# Patient Record
Sex: MALE | Race: WHITE | NOT HISPANIC OR LATINO | Employment: OTHER | URBAN - METROPOLITAN AREA
[De-identification: names, ages, dates, MRNs, and addresses within clinical notes are randomized per-mention and may not be internally consistent; named-entity substitution may affect disease eponyms.]

---

## 2024-07-08 ENCOUNTER — HOSPITAL ENCOUNTER (OUTPATIENT)
Facility: HOSPITAL | Age: 84
Discharge: HOME | End: 2024-07-09
Attending: EMERGENCY MEDICINE | Admitting: EMERGENCY MEDICINE
Payer: MEDICARE

## 2024-07-08 ENCOUNTER — APPOINTMENT (OUTPATIENT)
Facility: HOSPITAL | Age: 84
End: 2024-07-08
Payer: MEDICARE

## 2024-07-08 DIAGNOSIS — R55 SYNCOPE AND COLLAPSE: Primary | ICD-10-CM

## 2024-07-08 DIAGNOSIS — N17.9 ACUTE KIDNEY INJURY: ICD-10-CM

## 2024-07-08 DIAGNOSIS — D72.829 LEUKOCYTOSIS, UNSPECIFIED TYPE: ICD-10-CM

## 2024-07-08 DIAGNOSIS — E86.0 DEHYDRATION: ICD-10-CM

## 2024-07-08 LAB
ALBUMIN SERPL-MCNC: 4.2 G/DL (ref 3.5–5.2)
ALBUMIN/GLOB SERPL: 1.3 G/DL
ALP SERPL-CCNC: 94 U/L (ref 39–117)
ALT SERPL W P-5'-P-CCNC: 6 U/L (ref 1–41)
ANION GAP SERPL CALCULATED.3IONS-SCNC: 14.8 MMOL/L (ref 5–15)
AST SERPL-CCNC: 22 U/L (ref 1–40)
BACTERIA UR QL AUTO: NORMAL /HPF
BASOPHILS # BLD AUTO: 0.05 10*3/MM3 (ref 0–0.2)
BASOPHILS NFR BLD AUTO: 0.3 % (ref 0–1.5)
BILIRUB SERPL-MCNC: 0.7 MG/DL (ref 0–1.2)
BILIRUB UR QL STRIP: NEGATIVE
BUN SERPL-MCNC: 33 MG/DL (ref 8–23)
BUN/CREAT SERPL: 17.1 (ref 7–25)
CALCIUM SPEC-SCNC: 9.6 MG/DL (ref 8.6–10.5)
CHLORIDE SERPL-SCNC: 102 MMOL/L (ref 98–107)
CLARITY UR: CLEAR
CO2 SERPL-SCNC: 24.2 MMOL/L (ref 22–29)
COLOR UR: YELLOW
CREAT SERPL-MCNC: 1.93 MG/DL (ref 0.76–1.27)
DEPRECATED RDW RBC AUTO: 48.6 FL (ref 37–54)
EGFRCR SERPLBLD CKD-EPI 2021: 33.7 ML/MIN/1.73
EOSINOPHIL # BLD AUTO: 0.12 10*3/MM3 (ref 0–0.4)
EOSINOPHIL NFR BLD AUTO: 0.6 % (ref 0.3–6.2)
ERYTHROCYTE [DISTWIDTH] IN BLOOD BY AUTOMATED COUNT: 14.8 % (ref 12.3–15.4)
GLOBULIN UR ELPH-MCNC: 3.2 GM/DL
GLUCOSE BLDC GLUCOMTR-MCNC: 201 MG/DL (ref 70–130)
GLUCOSE SERPL-MCNC: 208 MG/DL (ref 65–99)
GLUCOSE UR STRIP-MCNC: ABNORMAL MG/DL
HCT VFR BLD AUTO: 41.4 % (ref 37.5–51)
HGB BLD-MCNC: 14.1 G/DL (ref 13–17.7)
HGB UR QL STRIP.AUTO: NEGATIVE
HOLD SPECIMEN: NORMAL
HYALINE CASTS UR QL AUTO: NORMAL /LPF
IMM GRANULOCYTES # BLD AUTO: 0.14 10*3/MM3 (ref 0–0.05)
IMM GRANULOCYTES NFR BLD AUTO: 0.7 % (ref 0–0.5)
KETONES UR QL STRIP: NEGATIVE
LEUKOCYTE ESTERASE UR QL STRIP.AUTO: NEGATIVE
LYMPHOCYTES # BLD AUTO: 1.88 10*3/MM3 (ref 0.7–3.1)
LYMPHOCYTES NFR BLD AUTO: 9.8 % (ref 19.6–45.3)
MAGNESIUM SERPL-MCNC: 2.5 MG/DL (ref 1.6–2.4)
MCH RBC QN AUTO: 30.3 PG (ref 26.6–33)
MCHC RBC AUTO-ENTMCNC: 34.1 G/DL (ref 31.5–35.7)
MCV RBC AUTO: 89 FL (ref 79–97)
MONOCYTES # BLD AUTO: 1.05 10*3/MM3 (ref 0.1–0.9)
MONOCYTES NFR BLD AUTO: 5.5 % (ref 5–12)
MUCOUS THREADS URNS QL MICRO: NORMAL /HPF
NEUTROPHILS NFR BLD AUTO: 15.98 10*3/MM3 (ref 1.7–7)
NEUTROPHILS NFR BLD AUTO: 83.1 % (ref 42.7–76)
NITRITE UR QL STRIP: NEGATIVE
NT-PROBNP SERPL-MCNC: 195 PG/ML (ref 0–1800)
PH UR STRIP.AUTO: 6 [PH] (ref 5–8)
PLATELET # BLD AUTO: 191 10*3/MM3 (ref 140–450)
PMV BLD AUTO: 10.2 FL (ref 6–12)
POTASSIUM SERPL-SCNC: 3.9 MMOL/L (ref 3.5–5.2)
PROT SERPL-MCNC: 7.4 G/DL (ref 6–8.5)
PROT UR QL STRIP: ABNORMAL
RBC # BLD AUTO: 4.65 10*6/MM3 (ref 4.14–5.8)
RBC # UR STRIP: NORMAL /HPF
REF LAB TEST METHOD: NORMAL
SODIUM SERPL-SCNC: 141 MMOL/L (ref 136–145)
SP GR UR STRIP: 1.01 (ref 1–1.03)
SQUAMOUS #/AREA URNS HPF: NORMAL /HPF
TROPONIN T SERPL HS-MCNC: 20 NG/L
TSH SERPL DL<=0.05 MIU/L-ACNC: 3.37 UIU/ML (ref 0.27–4.2)
UROBILINOGEN UR QL STRIP: ABNORMAL
WBC # UR STRIP: NORMAL /HPF
WBC NRBC COR # BLD AUTO: 19.22 10*3/MM3 (ref 3.4–10.8)
WHOLE BLOOD HOLD COAG: NORMAL
WHOLE BLOOD HOLD SPECIMEN: NORMAL

## 2024-07-08 PROCEDURE — 81001 URINALYSIS AUTO W/SCOPE: CPT | Performed by: EMERGENCY MEDICINE

## 2024-07-08 PROCEDURE — 83735 ASSAY OF MAGNESIUM: CPT | Performed by: EMERGENCY MEDICINE

## 2024-07-08 PROCEDURE — 84443 ASSAY THYROID STIM HORMONE: CPT | Performed by: EMERGENCY MEDICINE

## 2024-07-08 PROCEDURE — 93005 ELECTROCARDIOGRAM TRACING: CPT

## 2024-07-08 PROCEDURE — 25810000003 LACTATED RINGERS PER 1000 ML: Performed by: PHYSICIAN ASSISTANT

## 2024-07-08 PROCEDURE — 83880 ASSAY OF NATRIURETIC PEPTIDE: CPT | Performed by: EMERGENCY MEDICINE

## 2024-07-08 PROCEDURE — 80053 COMPREHEN METABOLIC PANEL: CPT | Performed by: EMERGENCY MEDICINE

## 2024-07-08 PROCEDURE — 84484 ASSAY OF TROPONIN QUANT: CPT | Performed by: EMERGENCY MEDICINE

## 2024-07-08 PROCEDURE — 36415 COLL VENOUS BLD VENIPUNCTURE: CPT

## 2024-07-08 PROCEDURE — 93005 ELECTROCARDIOGRAM TRACING: CPT | Performed by: EMERGENCY MEDICINE

## 2024-07-08 PROCEDURE — 99285 EMERGENCY DEPT VISIT HI MDM: CPT

## 2024-07-08 PROCEDURE — 85025 COMPLETE CBC W/AUTO DIFF WBC: CPT | Performed by: EMERGENCY MEDICINE

## 2024-07-08 PROCEDURE — 25810000003 LACTATED RINGERS SOLUTION: Performed by: EMERGENCY MEDICINE

## 2024-07-08 PROCEDURE — 71045 X-RAY EXAM CHEST 1 VIEW: CPT

## 2024-07-08 PROCEDURE — 82948 REAGENT STRIP/BLOOD GLUCOSE: CPT

## 2024-07-08 RX ORDER — HYDRALAZINE HYDROCHLORIDE 25 MG/1
25 TABLET, FILM COATED ORAL ONCE
Status: DISCONTINUED | OUTPATIENT
Start: 2024-07-08 | End: 2024-07-08

## 2024-07-08 RX ORDER — ATORVASTATIN CALCIUM 40 MG/1
40 TABLET, FILM COATED ORAL DAILY
COMMUNITY

## 2024-07-08 RX ORDER — ACETAMINOPHEN 325 MG/1
650 TABLET ORAL EVERY 6 HOURS PRN
Status: DISCONTINUED | OUTPATIENT
Start: 2024-07-08 | End: 2024-07-09 | Stop reason: HOSPADM

## 2024-07-08 RX ORDER — AMLODIPINE BESYLATE 5 MG/1
5 TABLET ORAL DAILY
COMMUNITY

## 2024-07-08 RX ORDER — ASPIRIN 81 MG/1
81 TABLET ORAL DAILY
COMMUNITY

## 2024-07-08 RX ORDER — CARVEDILOL 25 MG/1
25 TABLET ORAL 2 TIMES DAILY WITH MEALS
COMMUNITY

## 2024-07-08 RX ORDER — POTASSIUM CHLORIDE 750 MG/1
10 CAPSULE, EXTENDED RELEASE ORAL 2 TIMES DAILY
COMMUNITY

## 2024-07-08 RX ORDER — HYDRALAZINE HYDROCHLORIDE 25 MG/1
25 TABLET, FILM COATED ORAL ONCE
Status: COMPLETED | OUTPATIENT
Start: 2024-07-08 | End: 2024-07-08

## 2024-07-08 RX ORDER — FUROSEMIDE 20 MG/1
20 TABLET ORAL 2 TIMES DAILY
COMMUNITY

## 2024-07-08 RX ORDER — SODIUM CHLORIDE, SODIUM LACTATE, POTASSIUM CHLORIDE, CALCIUM CHLORIDE 600; 310; 30; 20 MG/100ML; MG/100ML; MG/100ML; MG/100ML
75 INJECTION, SOLUTION INTRAVENOUS CONTINUOUS
Status: DISCONTINUED | OUTPATIENT
Start: 2024-07-08 | End: 2024-07-09 | Stop reason: HOSPADM

## 2024-07-08 RX ORDER — HYDRALAZINE HYDROCHLORIDE 25 MG/1
25 TABLET, FILM COATED ORAL 3 TIMES DAILY
COMMUNITY

## 2024-07-08 RX ORDER — ATORVASTATIN CALCIUM 40 MG/1
40 TABLET, FILM COATED ORAL NIGHTLY
Status: DISCONTINUED | OUTPATIENT
Start: 2024-07-08 | End: 2024-07-09 | Stop reason: HOSPADM

## 2024-07-08 RX ORDER — SODIUM CHLORIDE 0.9 % (FLUSH) 0.9 %
10 SYRINGE (ML) INJECTION AS NEEDED
Status: DISCONTINUED | OUTPATIENT
Start: 2024-07-08 | End: 2024-07-09 | Stop reason: HOSPADM

## 2024-07-08 RX ORDER — DAPAGLIFLOZIN 5 MG/1
5 TABLET, FILM COATED ORAL DAILY
COMMUNITY

## 2024-07-08 RX ORDER — ONDANSETRON 2 MG/ML
4 INJECTION INTRAMUSCULAR; INTRAVENOUS EVERY 6 HOURS PRN
Status: DISCONTINUED | OUTPATIENT
Start: 2024-07-08 | End: 2024-07-09 | Stop reason: HOSPADM

## 2024-07-08 RX ADMIN — ATORVASTATIN CALCIUM 40 MG: 40 TABLET, FILM COATED ORAL at 20:08

## 2024-07-08 RX ADMIN — HYDRALAZINE HYDROCHLORIDE 25 MG: 25 TABLET ORAL at 20:08

## 2024-07-08 RX ADMIN — SODIUM CHLORIDE, POTASSIUM CHLORIDE, SODIUM LACTATE AND CALCIUM CHLORIDE 1000 ML: 600; 310; 30; 20 INJECTION, SOLUTION INTRAVENOUS at 16:03

## 2024-07-08 RX ADMIN — SODIUM CHLORIDE, POTASSIUM CHLORIDE, SODIUM LACTATE AND CALCIUM CHLORIDE 125 ML/HR: 600; 310; 30; 20 INJECTION, SOLUTION INTRAVENOUS at 19:34

## 2024-07-08 NOTE — ED NOTES
" Tian Forde    Nursing Report ED to Floor:  Mental status: a & o x 4  Ambulatory status: ambulatory x 1 assist  Oxygen Therapy:  n/a  Cardiac Rhythm: sinus aleksander with rbbb  Admitted from: ed  Safety Concerns:  n/a  Social Issues: n/a- wife at bs   ED Room #:  17    ED Nurse Phone Extension - [phone] or may call [phone].      HPI:   Chief Complaint   Patient presents with    Syncope       Past Medical History:  Past Medical History:   Diagnosis Date    Diabetes mellitus     Diverticulitis     Hyperlipidemia     Hypertension         Past Surgical History:  Past Surgical History:   Procedure Laterality Date    CAROTID ENDARTERECTOMY      COLON RESECTION      TOTAL HIP ARTHROPLASTY Left         Admitting Doctor:   No admitting provider for patient encounter.    Consulting Provider(s):  Consults       No orders found from 6/9/2024 to 7/9/2024.             Admitting Diagnosis:   The encounter diagnosis was Syncope and collapse.    Most Recent Vitals:   Vitals:    07/08/24 1445 07/08/24 1603   BP: (!) 186/89 158/73   BP Location: Right arm Right arm   Patient Position: Lying Lying   Pulse: 62    Resp: 18    Temp: 97.9 °F (36.6 °C)    TempSrc: Oral    SpO2: 95%    Weight: 87.1 kg (192 lb)    Height: 177.8 cm (70\")        Active LDAs/IV Access:   Lines, Drains & Airways       Active LDAs       Name Placement date Placement time Site Days    Peripheral IV 07/08/24 Left;Posterior Wrist 07/08/24  --  Wrist  less than 1                    Labs (abnormal labs have a star):   Labs Reviewed   COMPREHENSIVE METABOLIC PANEL - Abnormal; Notable for the following components:       Result Value    Glucose 208 (*)     BUN 33 (*)     Creatinine 1.93 (*)     eGFR 33.7 (*)     All other components within normal limits    Narrative:     GFR Normal >60  Chronic Kidney Disease <60  Kidney Failure <15    The GFR formula is only valid for adults with stable renal function between ages 18 and 70.   MAGNESIUM - Abnormal; Notable for the " following components:    Magnesium 2.5 (*)     All other components within normal limits   CBC WITH AUTO DIFFERENTIAL - Abnormal; Notable for the following components:    WBC 19.22 (*)     Neutrophil % 83.1 (*)     Lymphocyte % 9.8 (*)     Immature Grans % 0.7 (*)     Neutrophils, Absolute 15.98 (*)     Monocytes, Absolute 1.05 (*)     Immature Grans, Absolute 0.14 (*)     All other components within normal limits   URINALYSIS W/ MICROSCOPIC IF INDICATED (NO CULTURE) - Abnormal; Notable for the following components:    Glucose, UA >=1000 mg/dL (3+) (*)     Protein, UA 30 mg/dL (1+) (*)     All other components within normal limits   SINGLE HS TROPONIN T - Normal   TSH - Normal   BNP (IN-HOUSE) - Normal    Narrative:     This assay is used as an aid in the diagnosis of individuals suspected of having heart failure. It can be used as an aid in the diagnosis of acute decompensated heart failure (ADHF) in patients presenting with signs and symptoms of ADHF to the emergency department (ED). In addition, NT-proBNP of <300 pg/mL indicates ADHF is not likely.    Age Range Result Interpretation  NT-proBNP Concentration (pg/mL:      <50             Positive            >450                   Gray                 300-450                    Negative             <300    50-75           Positive            >900                  Gray                300-900                  Negative            <300      >75             Positive            >1800                  Gray                300-1800                  Negative            <300   RAINBOW DRAW    Narrative:     The following orders were created for panel order [place] Draw.  Procedure                               Abnormality         Status                     ---------                               -----------         ------                     Green Top (Gel)[[id number]]                                  Final result               Lavender Top[[id number]]                                      Final result               Gold Top - SST[[id number]]                                   Final result               Gray Top[[id number]]                                         Final result               Light Blue Top[[id number]]                                   Final result                 Please view results for these tests on the individual orders.   URINALYSIS, MICROSCOPIC ONLY   CBC AND DIFFERENTIAL    Narrative:     The following orders were created for panel order CBC & Differential.  Procedure                               Abnormality         Status                     ---------                               -----------         ------                     CBC Auto Differential[[id number]]        Abnormal            Final result                 Please view results for these tests on the individual orders.   GREEN TOP   LAVENDER TOP   GOLD TOP - SST   GRAY TOP   LIGHT BLUE TOP       Meds Given in ED:   Medications   sodium chloride 0.9 % flush 10 mL (has no administration in time range)   lactated ringers bolus 1,000 mL (1,000 mL Intravenous New Bag 7/8/24 1603)           Last NIH score:                                                          Dysphagia screening results:        [place] Coma Scale:  No data recorded     CIWA:        Restraint Type:            Isolation Status:  No active isolations

## 2024-07-08 NOTE — FSED PROVIDER NOTE
"Subjective  History of Present Illness:    Patient presents with episode of syncope.  Patient states he was out in the heat for several hours today and working on significant vertigo lightheadedness/wife states he was pale and sweaty after this she called EMS to come get the patient.  Patient completely passed out.  States now he feels much better denies any chest pain or shortness of breath at the time no nausea vomiting no numbness tingling weakness.  Patient does have a history of CHF      Nurses Notes reviewed and agree, including vitals, allergies, social history and prior medical history.     REVIEW OF SYSTEMS: All systems reviewed and not pertinent unless noted.  Review of Systems    Past Medical History:   Diagnosis Date    Diabetes mellitus     Diverticulitis     Hyperlipidemia     Hypertension        Allergies:    Patient has no known allergies.      Past Surgical History:   Procedure Laterality Date    CAROTID ENDARTERECTOMY      COLON RESECTION      TOTAL HIP ARTHROPLASTY Left          Social History     Socioeconomic History    Marital status:    Tobacco Use    Smoking status: Never    Smokeless tobacco: Never   Substance and Sexual Activity    Alcohol use: Not Currently         History reviewed. No pertinent family history.    Objective  Physical Exam:  /73 (BP Location: Right arm, Patient Position: Lying)   Pulse 62   Temp 97.9 °F (36.6 °C) (Oral)   Resp 18   Ht 177.8 cm (70\")   Wt 87.1 kg (192 lb)   SpO2 95%   BMI 27.55 kg/m²      Physical Exam  Vitals and nursing note reviewed.   Constitutional:       General: He is not in acute distress.     Appearance: He is well-developed. He is not ill-appearing, toxic-appearing or diaphoretic.   HENT:      Head: Normocephalic and atraumatic.      Mouth/Throat:      Mouth: Mucous membranes are moist.   Eyes:      Pupils: Pupils are equal, round, and reactive to light.   Cardiovascular:      Rate and Rhythm: Normal rate and regular rhythm.     "  Pulses: Normal pulses.      Heart sounds: Normal heart sounds.   Pulmonary:      Effort: Pulmonary effort is normal. No tachypnea, accessory muscle usage or respiratory distress.      Breath sounds: Normal breath sounds. No decreased breath sounds, wheezing, rhonchi or rales.   Abdominal:      Palpations: Abdomen is soft.      Tenderness: There is no abdominal tenderness. There is no guarding or rebound.   Musculoskeletal:         General: Normal range of motion.      Right lower leg: No edema.   Skin:     General: Skin is warm and dry.      Capillary Refill: Capillary refill takes less than 2 seconds.   Neurological:      General: No focal deficit present.      Mental Status: He is alert and oriented to person, place, and time.   Psychiatric:         Mood and Affect: Mood normal.         Behavior: Behavior normal.         ECG 12 Lead      Date/Time: 7/8/2024 4:15 PM    Performed by: Asher Koch DO  Authorized by: Asher Koch DO  Interpreted by ED physician  Rhythm: sinus rhythm  Rate: normal  BPM: 59  QRS axis: normal  Conduction: complete RBBB  ST Segments: ST segments normal  T Waves: T waves normal  Other: no other findings  Clinical impression: normal ECG        ED Course:         Lab Results (last 24 hours)       Procedure Component Value Units Date/Time    Comprehensive Metabolic Panel [[id number]]  (Abnormal) Collected: 07/08/24 1449    Specimen: Blood Updated: 07/08/24 1521     Glucose 208 mg/dL      BUN 33 mg/dL      Creatinine 1.93 mg/dL      Sodium 141 mmol/L      Potassium 3.9 mmol/L      Chloride 102 mmol/L      CO2 24.2 mmol/L      Calcium 9.6 mg/dL      Total Protein 7.4 g/dL      Albumin 4.2 g/dL      ALT (SGPT) 6 U/L      AST (SGOT) 22 U/L      Alkaline Phosphatase 94 U/L      Total Bilirubin 0.7 mg/dL      Globulin 3.2 gm/dL      A/G Ratio 1.3 g/dL      BUN/Creatinine Ratio 17.1     Anion Gap 14.8 mmol/L      eGFR 33.7 mL/min/1.73     Narrative:      GFR Normal >60  Chronic Kidney Disease  <60  Kidney Failure <15    The GFR formula is only valid for adults with stable renal function between ages 18 and 70.    Magnesium [[id number]]  (Abnormal) Collected: 07/08/24 1449    Specimen: Blood Updated: 07/08/24 1521     Magnesium 2.5 mg/dL     Single High Sensitivity Troponin T [[id number]]  (Normal) Collected: 07/08/24 1449    Specimen: Blood Updated: 07/08/24 1518     HS Troponin T 20 ng/L     TSH [[id number]]  (Normal) Collected: 07/08/24 1449    Specimen: Blood Updated: 07/08/24 1526     TSH 3.370 uIU/mL     BNP [[id number]]  (Normal) Collected: 07/08/24 1449    Specimen: Blood Updated: 07/08/24 1519     proBNP 195.0 pg/mL     Narrative:      This assay is used as an aid in the diagnosis of individuals suspected of having heart failure. It can be used as an aid in the diagnosis of acute decompensated heart failure (ADHF) in patients presenting with signs and symptoms of ADHF to the emergency department (ED). In addition, NT-proBNP of <300 pg/mL indicates ADHF is not likely.    Age Range Result Interpretation  NT-proBNP Concentration (pg/mL:      <50             Positive            >450                   Gray                 300-450                    Negative             <300    50-75           Positive            >900                  Gray                300-900                  Negative            <300      >75             Positive            >1800                  Gray                300-1800                  Negative            <300    CBC & Differential [[id number]]  (Abnormal) Collected: 07/08/24 1450    Specimen: Blood Updated: 07/08/24 1502    Narrative:      The following orders were created for panel order CBC & Differential.  Procedure                               Abnormality         Status                     ---------                               -----------         ------                     CBC Auto Differential[[id number]]        Abnormal            Final result                 Please view  results for these tests on the individual orders.    CBC Auto Differential [[id number]]  (Abnormal) Collected: 07/08/24 1450    Specimen: Blood Updated: 07/08/24 1502     WBC 19.22 10*3/mm3      RBC 4.65 10*6/mm3      Hemoglobin 14.1 g/dL      Hematocrit 41.4 %      MCV 89.0 fL      MCH 30.3 pg      MCHC 34.1 g/dL      RDW 14.8 %      RDW-SD 48.6 fl      MPV 10.2 fL      Platelets 191 10*3/mm3      Neutrophil % 83.1 %      Lymphocyte % 9.8 %      Monocyte % 5.5 %      Eosinophil % 0.6 %      Basophil % 0.3 %      Immature Grans % 0.7 %      Neutrophils, Absolute 15.98 10*3/mm3      Lymphocytes, Absolute 1.88 10*3/mm3      Monocytes, Absolute 1.05 10*3/mm3      Eosinophils, Absolute 0.12 10*3/mm3      Basophils, Absolute 0.05 10*3/mm3      Immature Grans, Absolute 0.14 10*3/mm3     Urinalysis With Microscopic If Indicated (No Culture) - Urine, Clean Catch [[id number]]  (Abnormal) Collected: 07/08/24 1537    Specimen: Urine, Clean Catch Updated: 07/08/24 1544     Color, UA Yellow     Appearance, UA Clear     pH, UA 6.0     Specific Gravity, UA 1.015     Glucose, UA >=1000 mg/dL (3+)     Ketones, UA Negative     Bilirubin, UA Negative     Blood, UA Negative     Protein, UA 30 mg/dL (1+)     Leuk Esterase, UA Negative     Nitrite, UA Negative     Urobilinogen, UA 0.2 E.U./dL    Urinalysis, Microscopic Only - Urine, Clean Catch [[id number]] Collected: 07/08/24 1537    Specimen: Urine, Clean Catch Updated: 07/08/24 1551     RBC, UA None Seen /HPF      WBC, UA None Seen /HPF      Bacteria, UA None Seen /HPF      Squamous Epithelial Cells, UA 0-2 /HPF      Hyaline Casts, UA 0-2 /LPF      Mucus, UA Trace /HPF      Methodology Manual Light Microscopy             XR Chest 1 View    Result Date: 7/8/2024  XR CHEST 1 VW Date of Exam: 7/8/2024 2:54 PM EDT Indication: Dysrhythmia triage protocol fainting spell Comparison: None available. Findings: Heart and pulmonary vessels appear within normal limits. Lung fields are well inflated  and clear of acute infiltrates or effusions. There is no pneumothorax. There is a fracture of the right eighth rib posteriorly although appears most likely old.    Impression: Impression: No acute process in the lung fields. Probable old right eighth rib fracture. Correlation to any site of acute pain recommended. Electronically Signed: Dang Jackson MD  7/8/2024 2:58 PM EDT  Workstation ID: [id number]        [facility]  Number of Diagnoses or Management Options  Syncope and collapse  Diagnosis management comments: Patient presenting with syncope.  Patient is otherwise well-appearing upon my assessment hypertensive EKG shows no acute ischemia or arrhythmia.  CBC has a leukocytosis of 19,000 CMP creatinine 1.9 urinalysis negative for infection chest x-ray is clear patient has no history of elevated creatinine or CKD we will go ahead and give 1 L of fluid.  Patient's leukocytosis I feel like it is most likely from his event today as the patient exhibits no signs of sepsis.  Most likely demargination of white blood cells.  Given his well appearance and improvement and no obvious cause other than he or vasovagal syncope we will go ahead and admit to the CDU for rehydration and repeat creatinine in the morning.  If no improvement or worsening patient will need to be admitted to the hospital.              Data interpreted: Nursing notes reviewed, vital signs reviewed.  Labs independently interpreted by me (CBC, CMP, lipase, UA, troponin, ABG, lactic acid, procalcitonin).  Imaging independently interpreted by me (x-ray, CT scan).  EKG independently interpreted by me.  O2 saturation:    Counseling: Discussed the results above with the patient regarding need for admission or discharge.  Patient understands and agrees plan of care.      -----  ED Disposition       ED Disposition   Send to Specialty Department    Condition   --    Comment   Level of Care: Telemetry [5]   Diagnosis: Syncope and collapse [780.2.ICD-9-CM]   Patient  Class: Outpatient [102]               Final diagnoses:   Syncope and collapse     Your Follow-Up Providers    Follow-up information has not been specified.       Contact information for after-discharge care    Follow-up information has not been specified.          Your medication list        ASK your doctor about these medications        Instructions Last Dose Given Next Dose Due   amLODIPine 5 MG tablet  Commonly known as: NORVASC      Take 1 tablet by mouth Daily.       aspirin 81 MG EC tablet      Take 1 tablet by mouth Daily.       atorvastatin 40 MG tablet  Commonly known as: LIPITOR      Take 1 tablet by mouth Daily.       carvedilol 25 MG tablet  Commonly known as: COREG      Take 1 tablet by mouth 2 (Two) Times a Day With Meals.       Farxiga 5 MG tablet tablet  Generic drug: dapagliflozin      Take 1 tablet by mouth Daily.       furosemide 20 MG tablet  Commonly known as: LASIX      Take 1 tablet by mouth 2 (Two) Times a Day.       hydrALAZINE 25 MG tablet  Commonly known as: APRESOLINE      Take 1 tablet by mouth 3 (Three) Times a Day.       potassium chloride 10 MEQ CR capsule  Commonly known as: MICRO-K      Take 1 capsule by mouth 2 (Two) Times a Day.

## 2024-07-08 NOTE — PROGRESS NOTES
[facility]     Progress Note    Patient Name: Tian Forde  : 1940  MRN: [id number]  Primary Care Physician:  Isaac, No Known  Date of admission: 2024    Subjective   Subjective     Chief Complaint: Presyncope, DAISY, leukocytosis    History of Present Illness  Patient Reports patient is an 84-year-old male with medical history of hypertension, hyperlipidemia, diabetes, CHF presents to the emergency department for evaluation of previous syncope that occurred today around noon.  Patient did 3 hours of yard work in the heat when he had an episode of feeling lightheaded and appeared pale prompting his wife to call EMS to have him brought to the emergency department.  Patient denies loss of consciousness, fall to the ground.  Patient denies dyspnea, chest pain, fever or recent illness.  Patient states symptoms lasted approximately 5 to 10 minutes before resolving.  Patient has been asymptomatic since with no symptoms on arrival to the CDU.  Patient was given 1 L of fluids in the emergency department.  Patient was found to have DAISY with a creatinine of 1.9 without previous values to compare to.  Patient denies known history of CKD.  Patient is also noticed to have white count of 19 without symptoms of infection and without known source.  Patient has negative chest x-ray and urinalysis.  At this time leukocytosis is likely reactive due to patient's recent episode.  Patient's initial troponin is unremarkable.    Review of Systems   Constitutional:  Negative for chills and fever.   HENT:  Negative for ear pain, sore throat and trouble swallowing.    Eyes:  Negative for visual disturbance.   Respiratory:  Negative for cough and shortness of breath.    Cardiovascular:  Negative for chest pain, palpitations and leg swelling.   Gastrointestinal:  Negative for abdominal distention, abdominal pain, nausea and vomiting.   Endocrine: Negative for cold intolerance and heat intolerance.   Genitourinary:   Negative for dysuria, flank pain, frequency and urgency.   Musculoskeletal:  Negative for back pain, gait problem and neck pain.   Skin:  Negative for rash.   Allergic/Immunologic: Negative for immunocompromised state.   Neurological:  Positive for light-headedness. Negative for dizziness, syncope and numbness.   Hematological:  Does not bruise/bleed easily.   Psychiatric/Behavioral:  Negative for hallucinations. The patient is not nervous/anxious.    All other systems reviewed and are negative.      Objective   Objective     Vitals:   Temp:  [97.9 °F (36.6 °C)] 97.9 °F (36.6 °C)  Heart Rate:  [57-62] 57  Resp:  [18] 18  BP: (158-186)/(73-89) 181/81    Physical Exam  Vitals and nursing note reviewed.   Constitutional:       General: He is not in acute distress.     Appearance: He is not toxic-appearing.   HENT:      Head: Normocephalic and atraumatic.      Nose: Nose normal.      Mouth/Throat:      Mouth: Mucous membranes are moist.   Eyes:      Extraocular Movements: Extraocular movements intact.      Conjunctiva/sclera: Conjunctivae normal.      Pupils: Pupils are equal, round, and reactive to light.   Cardiovascular:      Rate and Rhythm: Normal rate and regular rhythm.      Pulses: Normal pulses.      Heart sounds: Normal heart sounds. No murmur heard.  Pulmonary:      Effort: Pulmonary effort is normal.      Breath sounds: Normal breath sounds. No stridor. No wheezing or rhonchi.   Abdominal:      General: Bowel sounds are normal. There is no distension.      Palpations: Abdomen is soft.      Tenderness: There is no abdominal tenderness. There is no guarding or rebound.   Musculoskeletal:         General: No deformity. Normal range of motion.      Cervical back: Normal range of motion and neck supple.      Right lower leg: No edema.      Left lower leg: No edema.   Skin:     General: Skin is warm and dry.      Capillary Refill: Capillary refill takes less than 2 seconds.      Findings: No erythema or rash.    Neurological:      General: No focal deficit present.      Mental Status: He is alert and oriented to person, place, and time.      Cranial Nerves: No cranial nerve deficit.      Sensory: No sensory deficit.      Coordination: Coordination normal.      Gait: Gait normal.   Psychiatric:         Mood and Affect: Mood normal.         Behavior: Behavior normal.          Result Review    Result Review:  I have personally reviewed the results from the time of this admission to 7/8/2024 17:46 EDT and agree with these findings:  [x]  Laboratory list / accordion  []  Microbiology  [x]  Radiology  [x]  EKG/Telemetry   []  Cardiology/Vascular   []  Pathology  []  Old records  []  Other:  Most notable findings include: WBC 19, troponin 0 hours 20.  Creatinine 1.9      Assessment & Plan   Assessment / Plan     Brief Patient Summary:  Tian Forde is a 84 y.o. male who will be observed in the CDU for further evaluation of likely DAISY, leukocytosis, presyncopal event.  Patient reports a 5 to 10-minute episode of feeling lightheaded and appearing pale without loss of consciousness or fall to the ground.  Patient denies chest pain, dyspnea, recent fever or illness.  Patient is asymptomatic on arrival to CDU.    Active Hospital Problems:  There are no active hospital problems to display for this patient.    Plan: Patient received 1 L of fluids in the ED.  Patient plan includes serial troponins, fluid hydration with maintenance fluids, reevaluation of CBC, creatinine level, reevaluation.  Patient denies symptoms on arrival to the CDU and symptoms are likely heat related.  Patient denies chest pain or dyspnea with concern for cardiac etiology low at this time. In the setting of CHF history there will be consideration for possible echo in the morning if the patient once again developed symptoms.    Pre-Syncope:  - Serial Troponins. Troponin 0 hour 20.  - Orthostatic vital signs unremarkable  - Maintenance  mL/hr  -  Cardiac Monitoring  - Q 4 hour vital signs    DAISY:  - Creatinine 1.9 without baseline to compare to. Repeat AM BMP.  - UA shows mild proteinuria without hematuria or evidence of UTI  - Maintenance  mL/hr  - Avoid nephrotoxic medications    Leukocytosis:  - Likely reactive, patient without symptoms of infection. Does not meet sepsis criteria.  - Negative CXR  - Negative UA  - Repeat AM CBC    HTN:  - Hydralazine 25 mg BID  - Cardiac Monitor    DMII  -   - LR 1 liter in the ED. Maintenance fluids  - Farxiga 5 mg QD  - Fingerstick AC, Qhs    VTE Prophylaxis:  Mechanical VTE prophylaxis orders are present.        CODE STATUS:       Disposition:  I expect patient to be discharged 7/9/2024.    ANITRA Morris

## 2024-07-09 ENCOUNTER — APPOINTMENT (OUTPATIENT)
Facility: HOSPITAL | Age: 84
End: 2024-07-09
Payer: MEDICARE

## 2024-07-09 VITALS
TEMPERATURE: 97.5 F | WEIGHT: 192 LBS | RESPIRATION RATE: 19 BRPM | HEIGHT: 70 IN | HEART RATE: 60 BPM | DIASTOLIC BLOOD PRESSURE: 71 MMHG | OXYGEN SATURATION: 91 % | SYSTOLIC BLOOD PRESSURE: 160 MMHG | BODY MASS INDEX: 27.49 KG/M2

## 2024-07-09 LAB
ANION GAP SERPL CALCULATED.3IONS-SCNC: 12.8 MMOL/L (ref 5–15)
BASOPHILS # BLD AUTO: 0.06 10*3/MM3 (ref 0–0.2)
BASOPHILS NFR BLD AUTO: 0.5 % (ref 0–1.5)
BH CV ECHO MEAS - AI P1/2T: 777.7 MSEC
BH CV ECHO MEAS - AO MAX PG: 14.4 MMHG
BH CV ECHO MEAS - AO MEAN PG: 8 MMHG
BH CV ECHO MEAS - AO ROOT DIAM: 3.2 CM
BH CV ECHO MEAS - AO V2 MAX: 189.8 CM/SEC
BH CV ECHO MEAS - AO V2 VTI: 48.1 CM
BH CV ECHO MEAS - AVA(I,D): 1.49 CM2
BH CV ECHO MEAS - EDV(CUBED): 110.6 ML
BH CV ECHO MEAS - EDV(MOD-SP2): 185 ML
BH CV ECHO MEAS - EDV(MOD-SP4): 190 ML
BH CV ECHO MEAS - EF(MOD-BP): 62.4 %
BH CV ECHO MEAS - EF(MOD-SP2): 64.6 %
BH CV ECHO MEAS - EF(MOD-SP4): 61.4 %
BH CV ECHO MEAS - ESV(CUBED): 29.8 ML
BH CV ECHO MEAS - ESV(MOD-SP2): 65.4 ML
BH CV ECHO MEAS - ESV(MOD-SP4): 73.4 ML
BH CV ECHO MEAS - FS: 35.4 %
BH CV ECHO MEAS - IVS/LVPW: 1 CM
BH CV ECHO MEAS - IVSD: 1 CM
BH CV ECHO MEAS - LA DIMENSION: 3.9 CM
BH CV ECHO MEAS - LAT PEAK E' VEL: 7.8 CM/SEC
BH CV ECHO MEAS - LV DIASTOLIC VOL/BSA (35-75): 92.6 CM2
BH CV ECHO MEAS - LV MASS(C)D: 170.2 GRAMS
BH CV ECHO MEAS - LV MAX PG: 3.1 MMHG
BH CV ECHO MEAS - LV MEAN PG: 2 MMHG
BH CV ECHO MEAS - LV SYSTOLIC VOL/BSA (12-30): 35.8 CM2
BH CV ECHO MEAS - LV V1 MAX: 88 CM/SEC
BH CV ECHO MEAS - LV V1 VTI: 22.8 CM
BH CV ECHO MEAS - LVIDD: 4.8 CM
BH CV ECHO MEAS - LVIDS: 3.1 CM
BH CV ECHO MEAS - LVOT AREA: 3.1 CM2
BH CV ECHO MEAS - LVOT DIAM: 2 CM
BH CV ECHO MEAS - LVPWD: 1 CM
BH CV ECHO MEAS - MED PEAK E' VEL: 6.6 CM/SEC
BH CV ECHO MEAS - MV A MAX VEL: 103 CM/SEC
BH CV ECHO MEAS - MV DEC SLOPE: 252 CM/SEC2
BH CV ECHO MEAS - MV DEC TIME: 0.34 SEC
BH CV ECHO MEAS - MV E MAX VEL: 65 CM/SEC
BH CV ECHO MEAS - MV E/A: 0.63
BH CV ECHO MEAS - MV MAX PG: 5.9 MMHG
BH CV ECHO MEAS - MV MEAN PG: 1 MMHG
BH CV ECHO MEAS - MV P1/2T: 90.7 MSEC
BH CV ECHO MEAS - MV V2 VTI: 36.3 CM
BH CV ECHO MEAS - MVA(P1/2T): 2.43 CM2
BH CV ECHO MEAS - MVA(VTI): 1.97 CM2
BH CV ECHO MEAS - PA ACC TIME: 0.14 SEC
BH CV ECHO MEAS - PA V2 MAX: 114 CM/SEC
BH CV ECHO MEAS - SV(LVOT): 71.6 ML
BH CV ECHO MEAS - SV(MOD-SP2): 119.6 ML
BH CV ECHO MEAS - SV(MOD-SP4): 116.6 ML
BH CV ECHO MEAS - SVI(LVOT): 34.9 ML/M2
BH CV ECHO MEAS - SVI(MOD-SP2): 58.3 ML/M2
BH CV ECHO MEAS - SVI(MOD-SP4): 56.8 ML/M2
BH CV ECHO MEAS - TAPSE (>1.6): 2.5 CM
BH CV ECHO MEASUREMENTS AVERAGE E/E' RATIO: 9.03
BH CV REST NUCLEAR ISOTOPE DOSE: 9.6 MCI
BH CV STRESS BP STAGE 3: NORMAL
BH CV STRESS BP STAGE 4: NORMAL
BH CV STRESS COMMENTS STAGE 1: NORMAL
BH CV STRESS DOSE REGADENOSON STAGE 1: 0.4
BH CV STRESS DURATION MIN STAGE 1: 1
BH CV STRESS DURATION MIN STAGE 2: 1
BH CV STRESS DURATION MIN STAGE 3: 1
BH CV STRESS DURATION MIN STAGE 4: 1
BH CV STRESS DURATION SEC STAGE 2: 0
BH CV STRESS HR STAGE 1: 66
BH CV STRESS HR STAGE 2: 74
BH CV STRESS HR STAGE 3: 81
BH CV STRESS HR STAGE 4: 82
BH CV STRESS NUCLEAR ISOTOPE DOSE: 32.7 MCI
BH CV STRESS O2 STAGE 1: 96
BH CV STRESS O2 STAGE 3: 97
BH CV STRESS O2 STAGE 4: 95
BH CV STRESS PROTOCOL 1: NORMAL
BH CV STRESS RECOVERY BP: NORMAL MMHG
BH CV STRESS RECOVERY HR: 67 BPM
BH CV STRESS RECOVERY O2: 96 %
BH CV STRESS STAGE 1: 1
BH CV STRESS STAGE 2: 2
BH CV STRESS STAGE 3: 3
BH CV STRESS STAGE 4: 4
BH CV XLRA - RV BASE: 3.6 CM
BH CV XLRA - RV LENGTH: 6 CM
BH CV XLRA - RV MID: 2.8 CM
BH CV XLRA - TDI S': 13.6 CM/SEC
BUN SERPL-MCNC: 29 MG/DL (ref 8–23)
BUN/CREAT SERPL: 21.2 (ref 7–25)
CALCIUM SPEC-SCNC: 9 MG/DL (ref 8.6–10.5)
CHLORIDE SERPL-SCNC: 105 MMOL/L (ref 98–107)
CO2 SERPL-SCNC: 23.2 MMOL/L (ref 22–29)
CREAT SERPL-MCNC: 1.37 MG/DL (ref 0.76–1.27)
DEPRECATED RDW RBC AUTO: 47.5 FL (ref 37–54)
EGFRCR SERPLBLD CKD-EPI 2021: 50.9 ML/MIN/1.73
EOSINOPHIL # BLD AUTO: 0.23 10*3/MM3 (ref 0–0.4)
EOSINOPHIL NFR BLD AUTO: 2 % (ref 0.3–6.2)
ERYTHROCYTE [DISTWIDTH] IN BLOOD BY AUTOMATED COUNT: 14.7 % (ref 12.3–15.4)
GLUCOSE BLDC GLUCOMTR-MCNC: 147 MG/DL (ref 70–130)
GLUCOSE BLDC GLUCOMTR-MCNC: 221 MG/DL (ref 70–130)
GLUCOSE SERPL-MCNC: 138 MG/DL (ref 65–99)
HCT VFR BLD AUTO: 39.9 % (ref 37.5–51)
HGB BLD-MCNC: 13.7 G/DL (ref 13–17.7)
IMM GRANULOCYTES # BLD AUTO: 0.04 10*3/MM3 (ref 0–0.05)
IMM GRANULOCYTES NFR BLD AUTO: 0.3 % (ref 0–0.5)
LEFT ATRIUM VOLUME INDEX: 24.6 ML/M2
LV EF NUC BP: 68 %
LYMPHOCYTES # BLD AUTO: 2.23 10*3/MM3 (ref 0.7–3.1)
LYMPHOCYTES NFR BLD AUTO: 19.5 % (ref 19.6–45.3)
MAXIMAL PREDICTED HEART RATE: 136 BPM
MCH RBC QN AUTO: 29.6 PG (ref 26.6–33)
MCHC RBC AUTO-ENTMCNC: 34.3 G/DL (ref 31.5–35.7)
MCV RBC AUTO: 86.2 FL (ref 79–97)
MONOCYTES # BLD AUTO: 0.83 10*3/MM3 (ref 0.1–0.9)
MONOCYTES NFR BLD AUTO: 7.3 % (ref 5–12)
NEUTROPHILS NFR BLD AUTO: 70.4 % (ref 42.7–76)
NEUTROPHILS NFR BLD AUTO: 8.04 10*3/MM3 (ref 1.7–7)
PERCENT MAX PREDICTED HR: 63.24 %
PLATELET # BLD AUTO: 172 10*3/MM3 (ref 140–450)
PMV BLD AUTO: 9.5 FL (ref 6–12)
POTASSIUM SERPL-SCNC: 3.4 MMOL/L (ref 3.5–5.2)
QT INTERVAL: 474 MS
QTC INTERVAL: 469 MS
RBC # BLD AUTO: 4.63 10*6/MM3 (ref 4.14–5.8)
SODIUM SERPL-SCNC: 141 MMOL/L (ref 136–145)
STRESS BASELINE BP: NORMAL MMHG
STRESS BASELINE HR: 53 BPM
STRESS O2 SAT REST: 97 %
STRESS PERCENT HR: 74 %
STRESS POST EXERCISE DUR MIN: 4 MIN
STRESS POST EXERCISE DUR SEC: 0 SEC
STRESS POST O2 SAT PEAK: 96 %
STRESS POST PEAK BP: NORMAL MMHG
STRESS POST PEAK HR: 86 BPM
STRESS TARGET HR: 116 BPM
TROPONIN T SERPL HS-MCNC: 18 NG/L
WBC NRBC COR # BLD AUTO: 11.43 10*3/MM3 (ref 3.4–10.8)

## 2024-07-09 PROCEDURE — 93017 CV STRESS TEST TRACING ONLY: CPT

## 2024-07-09 PROCEDURE — 93306 TTE W/DOPPLER COMPLETE: CPT | Performed by: INTERNAL MEDICINE

## 2024-07-09 PROCEDURE — 25010000002 SULFUR HEXAFLUORIDE MICROSPH 60.7-25 MG RECONSTITUTED SUSPENSION

## 2024-07-09 PROCEDURE — 93306 TTE W/DOPPLER COMPLETE: CPT

## 2024-07-09 PROCEDURE — 80048 BASIC METABOLIC PNL TOTAL CA: CPT | Performed by: PHYSICIAN ASSISTANT

## 2024-07-09 PROCEDURE — 25010000002 HYDRALAZINE PER 20 MG

## 2024-07-09 PROCEDURE — 25010000002 REGADENOSON 0.4 MG/5ML SOLUTION

## 2024-07-09 PROCEDURE — 78452 HT MUSCLE IMAGE SPECT MULT: CPT

## 2024-07-09 PROCEDURE — 96374 THER/PROPH/DIAG INJ IV PUSH: CPT

## 2024-07-09 PROCEDURE — 25810000003 LACTATED RINGERS PER 1000 ML

## 2024-07-09 PROCEDURE — 85025 COMPLETE CBC W/AUTO DIFF WBC: CPT | Performed by: PHYSICIAN ASSISTANT

## 2024-07-09 PROCEDURE — 0 TECHNETIUM SESTAMIBI

## 2024-07-09 PROCEDURE — 78452 HT MUSCLE IMAGE SPECT MULT: CPT | Performed by: INTERNAL MEDICINE

## 2024-07-09 PROCEDURE — 84484 ASSAY OF TROPONIN QUANT: CPT | Performed by: PHYSICIAN ASSISTANT

## 2024-07-09 PROCEDURE — A9500 TC99M SESTAMIBI: HCPCS

## 2024-07-09 PROCEDURE — 82948 REAGENT STRIP/BLOOD GLUCOSE: CPT

## 2024-07-09 PROCEDURE — 82948 REAGENT STRIP/BLOOD GLUCOSE: CPT | Performed by: PHYSICIAN ASSISTANT

## 2024-07-09 PROCEDURE — 93018 CV STRESS TEST I&R ONLY: CPT | Performed by: INTERNAL MEDICINE

## 2024-07-09 RX ORDER — POTASSIUM CHLORIDE 750 MG/1
10 CAPSULE, EXTENDED RELEASE ORAL ONCE
Status: DISCONTINUED | OUTPATIENT
Start: 2024-07-09 | End: 2024-07-09 | Stop reason: HOSPADM

## 2024-07-09 RX ORDER — CARVEDILOL 12.5 MG/1
25 TABLET ORAL ONCE
Status: COMPLETED | OUTPATIENT
Start: 2024-07-09 | End: 2024-07-09

## 2024-07-09 RX ORDER — REGADENOSON 0.08 MG/ML
0.4 INJECTION, SOLUTION INTRAVENOUS ONCE
Status: COMPLETED | OUTPATIENT
Start: 2024-07-09 | End: 2024-07-09

## 2024-07-09 RX ORDER — AMLODIPINE BESYLATE 5 MG/1
5 TABLET ORAL
Status: DISCONTINUED | OUTPATIENT
Start: 2024-07-09 | End: 2024-07-09 | Stop reason: HOSPADM

## 2024-07-09 RX ORDER — ASPIRIN 81 MG/1
81 TABLET, CHEWABLE ORAL DAILY
Status: DISCONTINUED | OUTPATIENT
Start: 2024-07-09 | End: 2024-07-09 | Stop reason: HOSPADM

## 2024-07-09 RX ORDER — HYDRALAZINE HYDROCHLORIDE 20 MG/ML
5 INJECTION INTRAMUSCULAR; INTRAVENOUS ONCE
Status: DISCONTINUED | OUTPATIENT
Start: 2024-07-09 | End: 2024-07-09

## 2024-07-09 RX ORDER — POTASSIUM CHLORIDE 750 MG/1
1 TABLET, FILM COATED, EXTENDED RELEASE ORAL DAILY
COMMUNITY
Start: 2024-04-20

## 2024-07-09 RX ORDER — FUROSEMIDE 40 MG/1
20 TABLET ORAL DAILY
Status: DISCONTINUED | OUTPATIENT
Start: 2024-07-09 | End: 2024-07-09 | Stop reason: HOSPADM

## 2024-07-09 RX ORDER — HYDRALAZINE HYDROCHLORIDE 25 MG/1
25 TABLET, FILM COATED ORAL ONCE
Status: COMPLETED | OUTPATIENT
Start: 2024-07-09 | End: 2024-07-09

## 2024-07-09 RX ORDER — HYDRALAZINE HYDROCHLORIDE 20 MG/ML
5 INJECTION INTRAMUSCULAR; INTRAVENOUS EVERY 6 HOURS PRN
Status: DISCONTINUED | OUTPATIENT
Start: 2024-07-09 | End: 2024-07-09 | Stop reason: HOSPADM

## 2024-07-09 RX ORDER — POTASSIUM CHLORIDE 750 MG/1
10 CAPSULE, EXTENDED RELEASE ORAL ONCE
Status: COMPLETED | OUTPATIENT
Start: 2024-07-09 | End: 2024-07-09

## 2024-07-09 RX ADMIN — CARVEDILOL 25 MG: 12.5 TABLET, FILM COATED ORAL at 11:49

## 2024-07-09 RX ADMIN — POTASSIUM CHLORIDE 10 MEQ: 750 CAPSULE, EXTENDED RELEASE ORAL at 06:24

## 2024-07-09 RX ADMIN — AMLODIPINE BESYLATE 5 MG: 5 TABLET ORAL at 11:49

## 2024-07-09 RX ADMIN — FUROSEMIDE 20 MG: 40 TABLET ORAL at 11:49

## 2024-07-09 RX ADMIN — HYDRALAZINE HYDROCHLORIDE 5 MG: 20 INJECTION INTRAMUSCULAR; INTRAVENOUS at 08:00

## 2024-07-09 RX ADMIN — ASPIRIN 81 MG CHEWABLE TABLET 81 MG: 81 TABLET CHEWABLE at 11:49

## 2024-07-09 RX ADMIN — TECHNETIUM TC 99M SESTAMIBI 1 DOSE: 1 INJECTION INTRAVENOUS at 08:25

## 2024-07-09 RX ADMIN — REGADENOSON 0.4 MG: 0.08 INJECTION INTRAVENOUS at 10:09

## 2024-07-09 RX ADMIN — HYDRALAZINE HYDROCHLORIDE 25 MG: 25 TABLET ORAL at 11:49

## 2024-07-09 RX ADMIN — SODIUM CHLORIDE, POTASSIUM CHLORIDE, SODIUM LACTATE AND CALCIUM CHLORIDE 75 ML/HR: 600; 310; 30; 20 INJECTION, SOLUTION INTRAVENOUS at 02:43

## 2024-07-09 RX ADMIN — TECHNETIUM TC 99M SESTAMIBI 1 DOSE: 1 INJECTION INTRAVENOUS at 10:10

## 2024-07-09 RX ADMIN — SULFUR HEXAFLUORIDE 2 ML: KIT at 09:34

## 2024-07-09 NOTE — PLAN OF CARE
Problem: Adult Inpatient Plan of Care  Goal: Plan of Care Review  Outcome: Ongoing, Progressing  Flowsheets (Taken 7/9/2024 8992)  Progress: improving  Plan of Care Reviewed With: patient

## 2024-07-09 NOTE — DISCHARGE SUMMARY
Patient Name: Tian Forde  : 1940  MRN: [id number]    Date of Admission: 2024  Date of Discharge:  24   Primary Care Physician: Provider, No Known      Presenting Problem:   Syncope and collapse [R55]    Active and Resolved Hospital Problems:  There are no hospital problems to display for this patient.        Hospital Course: Patient is an 84-year-old male with medical history of diabetes, hyperlipidemia, hypertension, CHF presents to the emergency department for near syncope that occurred after doing yard work in the heat for 3 hours.  Patient states he had an episode of feeling lightheaded and looking pale but did not actually syncopize.  Patient was found to have leukocytosis of 19 in the ER but denies any symptoms of infection and does not meet criteria for sepsis and there is no source of infection noted in ER workup.  Leukocytosis likely reactive after episode the heat.  Patient is also noted to have a creatinine of 1.9 concerning for DAISY likely due to dehydration.  Patient denies chest pain, dyspnea, lower extremity edema, any other associated symptoms.  Patient is asymptomatic when arrived to the CDU.    Nurses Notes reviewed and agree, including vitals, allergies, social history and prior medical history.     REVIEW OF SYSTEMS: All systems reviewed and not pertinent unless noted.  Review of Systems   Constitutional:  Negative for chills and fever.   HENT:  Negative for ear pain, sore throat and trouble swallowing.    Eyes:  Negative for visual disturbance.   Respiratory:  Negative for cough and shortness of breath.    Cardiovascular:  Negative for chest pain, palpitations and leg swelling.   Gastrointestinal:  Negative for abdominal distention, abdominal pain, nausea and vomiting.   Endocrine: Negative for cold intolerance and heat intolerance.   Genitourinary:  Negative for dysuria, flank pain, frequency and urgency.   Musculoskeletal:  Negative for back pain, gait problem and  "neck pain.   Skin:  Negative for rash.   Allergic/Immunologic: Negative for immunocompromised state.   Neurological:  Positive for light-headedness. Negative for dizziness, syncope and numbness.   Hematological:  Does not bruise/bleed easily.   Psychiatric/Behavioral:  Negative for hallucinations. The patient is not nervous/anxious.    All other systems reviewed and are negative.      Past Medical History:   Diagnosis Date    Diabetes mellitus     Diverticulitis     Hyperlipidemia     Hypertension        Allergies:    Patient has no known allergies.      Past Surgical History:   Procedure Laterality Date    CAROTID ENDARTERECTOMY      COLON RESECTION      TOTAL HIP ARTHROPLASTY Left          Social History     Socioeconomic History    Marital status:    Tobacco Use    Smoking status: Never    Smokeless tobacco: Never   Vaping Use    Vaping status: Never Used   Substance and Sexual Activity    Alcohol use: Not Currently    Drug use: Never    Sexual activity: Defer         History reviewed. No pertinent family history.    Objective  Physical Exam:  /71   Pulse 60   Temp 97.5 °F (36.4 °C) (Oral)   Resp 19   Ht 177.8 cm (70\")   Wt 87.1 kg (192 lb)   SpO2 91%   BMI 27.55 kg/m²      Physical Exam  Vitals and nursing note reviewed.   Constitutional:       General: He is not in acute distress.     Appearance: He is not toxic-appearing.   HENT:      Head: Normocephalic and atraumatic.      Nose: Nose normal.      Mouth/Throat:      Mouth: Mucous membranes are moist.   Eyes:      Extraocular Movements: Extraocular movements intact.      Conjunctiva/sclera: Conjunctivae normal.      Pupils: Pupils are equal, round, and reactive to light.   Cardiovascular:      Rate and Rhythm: Normal rate and regular rhythm.      Pulses: Normal pulses.      Heart sounds: Normal heart sounds. No murmur heard.  Pulmonary:      Effort: Pulmonary effort is normal.      Breath sounds: Normal breath sounds. No stridor. No " wheezing or rhonchi.   Abdominal:      General: Bowel sounds are normal. There is no distension.      Palpations: Abdomen is soft.      Tenderness: There is no abdominal tenderness. There is no guarding or rebound.   Musculoskeletal:         General: No deformity. Normal range of motion.      Cervical back: Normal range of motion and neck supple.      Right lower leg: No edema.      Left lower leg: No edema.   Skin:     General: Skin is warm and dry.      Capillary Refill: Capillary refill takes less than 2 seconds.      Findings: No erythema or rash.   Neurological:      General: No focal deficit present.      Mental Status: He is alert and oriented to person, place, and time.      Cranial Nerves: No cranial nerve deficit.      Sensory: No sensory deficit.      Coordination: Coordination normal.      Gait: Gait normal.   Psychiatric:         Mood and Affect: Mood normal.         Behavior: Behavior normal.         Procedures    Stress Test With Myocardial Perfusion One Day    Result Date: 7/9/2024    Lexiscan cardiolite with normal perfusion.   Left ventricular ejection fraction is normal (Calculated EF = 68%).   Pt. denied chest discomfort with the Nydia-walk. did state he was a little SOA but this resolved within a few minutes.   RBBB throughout the test   Diffuse coronary, aortic valve, and  aortic calcifications noted.     Adult Transthoracic Echo Complete W/ Cont if Necessary Per Protocol    Result Date: 7/9/2024    Left ventricular ejection fraction appears to be 61 - 65%.   Left ventricular diastolic function is consistent with (grade I) impaired relaxation.   There is calcification of the aortic valve.   Mild aortic valve regurgitation is present.   Aortic valve maximum pressure gradient is 14 mmHg.     XR Chest 1 View    Result Date: 7/8/2024  XR CHEST 1 VW Date of Exam: 7/8/2024 2:54 PM EDT Indication: Dysrhythmia triage protocol fainting spell Comparison: None available. Findings: Heart and pulmonary  "vessels appear within normal limits. Lung fields are well inflated and clear of acute infiltrates or effusions. There is no pneumothorax. There is a fracture of the right eighth rib posteriorly although appears most likely old.    Impression: Impression: No acute process in the lung fields. Probable old right eighth rib fracture. Correlation to any site of acute pain recommended. Electronically Signed: Dang Jackson MD  7/8/2024 2:58 PM EDT  Workstation ID: [id number]            No results found for: \"SITE\", \"ALLENTEST\", \"PHART\", \"ZBP4UIB\", \"PO2ART\", \"FAJ8NZT\", \"BASEEXCESS\", \"F8QGXPUC\", \"HGBBG\", \"HCTABG\", \"OXYHEMOGLOBI\", \"METHHGBN\", \"CARBOXYHGB\", \"CO2CT\", \"BAROMETRIC\", \"MODALITY\", \"FIO2\"    Results from last 7 days   Lab Units 07/09/24  0517 07/08/24  1449   HSTROP T ng/L 18 20       Results from last 7 days   Lab Units 07/09/24  0517 07/08/24  1450   WBC 10*3/mm3 11.43* 19.22*   HEMOGLOBIN g/dL 13.7 14.1   HEMATOCRIT % 39.9 41.4   PLATELETS 10*3/mm3 172 191       Results from last 7 days   Lab Units 07/09/24  0517 07/08/24  1449   SODIUM mmol/L 141 141   POTASSIUM mmol/L 3.4* 3.9   CHLORIDE mmol/L 105 102   CO2 mmol/L 23.2 24.2   BUN mg/dL 29* 33*   CREATININE mg/dL 1.37* 1.93*   CALCIUM mg/dL 9.0 9.6   BILIRUBIN mg/dL  --  0.7   ALK PHOS U/L  --  94   ALT (SGPT) U/L  --  6   AST (SGOT) U/L  --  22   GLUCOSE mg/dL 138* 208*       No results found for: \"CHOL\", \"CHLPL\", \"TRIG\", \"HDL\", \"LDL\", \"LDLDIRECT\"            No results found for: \"URINECX\"    @[email or web address];urinedrugscreen@    ED Disposition       ED Disposition   Send to Specialty Department    Condition   --    Comment   Level of Care: Telemetry [5]   Diagnosis: Syncope and collapse [780.2.ICD-9-CM]   Patient Class: Outpatient [102]                      Discharge Medication List:      Your medication list        CONTINUE taking these medications        Instructions Last Dose Given Next Dose Due   amLODIPine 5 MG tablet  Commonly known as: NORVASC      Take 1 " tablet by mouth Daily.       aspirin 81 MG EC tablet      Take 1 tablet by mouth Daily.       atorvastatin 40 MG tablet  Commonly known as: LIPITOR      Take 1 tablet by mouth Daily.       carvedilol 25 MG tablet  Commonly known as: COREG      Take 1 tablet by mouth 2 (Two) Times a Day With Meals.       Farxiga 5 MG tablet tablet  Generic drug: dapagliflozin      Take 1 tablet by mouth Daily.       furosemide 20 MG tablet  Commonly known as: LASIX      Take 1 tablet by mouth 2 (Two) Times a Day.       hydrALAZINE 25 MG tablet  Commonly known as: APRESOLINE      Take 1 tablet by mouth 3 (Three) Times a Day.       potassium chloride 10 MEQ CR tablet      Take 1 tablet by mouth Daily.       potassium chloride 10 MEQ CR capsule  Commonly known as: MICRO-K      Take 1 capsule by mouth 2 (Two) Times a Day.                 ANITRA Morris   07/09/24   17:23 EDT     Plan:    Presyncope:  - Likely due to heat exhaustion and dehydration.  Completely resolved symptoms after rehydration and getting out of the heat.   - Echocardiogram notes aortic valve sclerosis and calcification without hemodynamically significant stenosis. Echocardiogram without concerning findings to explain Pts symptoms.   - Stress test without arrhythmias, EKG changes, significant symptoms.   - Cardiology referral to establish care and reevaluation.  - Continue Furosemide, ASA, Carvedilol    Asymptomatic Hypertension:  -  Cardiology referral  - Continue Amlodipine, hydralazine, carvedilol and follow up with PCP within 1 week for recheck.    DAISY:  - Creatinine improved 1.9 -> 1.37 after fluid rehydration.   - Avoid NSAIDs  - Follow up with PCP for repeat blood work.  - Push hydration.     Hypokalemia:  - K+ 3.4. Pt received oral supplementation. Continue previously prescribed potassium chloride tablets 10 mg QD.  - Repeat lab work with PCP for recheck.    Leukocytosis:  - Resolved after fluid rehydration. Likely reactive from heat exhaustion and  near syncopal event. No source of infection on workup and Pt is asymptomatic.     Patient and family are agreeable plan to discharge home close outpatient follow-up with PCP for recheck of potassium chloride, creatinine level.  Patient is given referral for cardiology for further follow-up of hypertension as well as presyncopal symptoms and to go over stress test and echocardiogram findings.  Patient continues to remain hemodynamically stable and asymptomatic and well-appearing at the time of discharge.  Patient is given strict return precautions to the emergency department.    Time Spent on Discharge:  I spent 50 minutes on this discharge activity which included: face-to-face encounter with the patient, reviewing the data in the system, coordination of the care with the nursing staff as well as consultants, documentation, and entering orders.

## 2024-07-09 NOTE — PROGRESS NOTES
[facility]     Progress Note  CDU Observation    Patient Name: Tian Forde  : 1940  MRN: [id number]  Primary Care Physician:  Provider, No Known  Date of admission: 2024    Subjective   Subjective     Chief Complaint: Syncope    Syncope  Associated symptoms include light-headedness. Pertinent negatives include no abdominal pain, back pain, chest pain, diaphoresis, dizziness, fever, headaches, nausea, palpitations, vomiting or weakness.     Patient presented to the ED with complaints of a near syncopal event.  Patient is an 84-year-old male who presented to the emergency department today after near syncopal event.  Patient states he was out in his yard working on it for over 3 hours from he took many intermittent breaks throughout the day.  He stated he was drinking water at that time.  Patient states he had experienced an overwhelming feeling of lightheadedness and was pale and diaphoretic.  Patient was brought to the emergency department by EMS.  Patient denies any chest pain, pressure or discomfort.  Patient denies any shortness of breath or difficulty breathing.  Patient did not lose consciousness or have a syncopal event.  In the emergency department the patient was found to have what they believed was an DAISY however they did not have other labs to compare to.  Patient also had a increased white blood cell count of 19,000 without concerns of an infectious source.  Patient's cardiac workup was unremarkable in the emergency department.  Patient was admitted to the CDU for the near syncopal event, DAISY with plans for an ECHO and stress test in the morning in addition to repeat labs for kidney functions.    Review of Systems   Constitutional:  Negative for activity change, appetite change, chills, diaphoresis, fatigue and fever.   Respiratory:  Negative for chest tightness and shortness of breath.    Cardiovascular:  Positive for syncope. Negative for chest pain, palpitations and leg  swelling.   Gastrointestinal:  Negative for abdominal pain, constipation, diarrhea, nausea, rectal pain and vomiting.   Genitourinary:  Negative for flank pain.   Musculoskeletal:  Negative for back pain, joint swelling, neck pain and neck stiffness.   Skin:  Negative for color change and pallor.   Neurological:  Positive for light-headedness. Negative for dizziness, tremors, seizures, syncope, speech difficulty, weakness, numbness and headaches.   All other systems reviewed and are negative.      Objective   Objective     Vitals:   Temp:  [97.7 °F (36.5 °C)-98 °F (36.7 °C)] 97.7 °F (36.5 °C)  Heart Rate:  [56-63] 56  Resp:  [14-19] 14  BP: (136-186)/() 136/47    Physical Exam  Vitals and nursing note reviewed.   Constitutional:       General: He is not in acute distress.     Appearance: Normal appearance. He is normal weight. He is not ill-appearing, toxic-appearing or diaphoretic.   HENT:      Head: Normocephalic and atraumatic.      Right Ear: Tympanic membrane, ear canal and external ear normal. There is no impacted cerumen.      Left Ear: Tympanic membrane, ear canal and external ear normal. There is no impacted cerumen.      Nose: Nose normal. No congestion.      Mouth/Throat:      Mouth: Mucous membranes are moist.      Pharynx: Oropharynx is clear. No oropharyngeal exudate.   Eyes:      Extraocular Movements: Extraocular movements intact.      Conjunctiva/sclera: Conjunctivae normal.      Pupils: Pupils are equal, round, and reactive to light.   Neck:      Vascular: No carotid bruit.   Cardiovascular:      Rate and Rhythm: Normal rate and regular rhythm.      Pulses: Normal pulses.      Heart sounds: Normal heart sounds. No murmur heard.     No friction rub. No gallop.   Pulmonary:      Effort: Pulmonary effort is normal. No respiratory distress.      Breath sounds: Normal breath sounds. No stridor. No wheezing, rhonchi or rales.   Chest:      Chest wall: No tenderness.   Abdominal:      General: Bowel  sounds are normal. There is no distension.      Palpations: Abdomen is soft.   Musculoskeletal:         General: No swelling or tenderness. Normal range of motion.      Cervical back: Normal range of motion and neck supple. No rigidity or tenderness.   Skin:     General: Skin is warm.      Capillary Refill: Capillary refill takes less than 2 seconds.      Coloration: Skin is not pale.   Neurological:      General: No focal deficit present.      Mental Status: He is alert and oriented to person, place, and time. Mental status is at baseline.      Cranial Nerves: No cranial nerve deficit.   Psychiatric:         Mood and Affect: Mood normal.         Behavior: Behavior normal.         Thought Content: Thought content normal.         Judgment: Judgment normal.          Result Review    Result Review:  I have personally reviewed the results from the time of this admission to 7/9/2024 04:33 EDT and agree with these findings:  [x]  Laboratory list / accordion  []  Microbiology  [x]  Radiology  [x]  EKG/Telemetry   [x]  Cardiology/Vascular   []  Pathology  []  Old records  []  Other:  Most notable findings include: White blood cell count 19,000, creatinine of 1.9      Assessment & Plan   Assessment / Plan     Brief Patient Summary:  Tian Forde is a 84 y.o. male who emergency department today after a near syncopal episode.  Patient had been working on his lawn for 3 hours before this event occurred.  Patient was admitted to the CDU for observation with concerns of an acute kidney injury, near syncopal event and leukocytosis.  Patient felt lightheaded, pale, diaphoretic while he was outside working on his lawn.  Patient denies any chest pain, pressure or discomfort.  Patient denies any shortness of breath or difficulty breathing.  Patient never lost consciousness or syncopized.  Patient was admitted to the CDU for observation for fluid rehydration, repeat renal indices, an echocardiogram and a stress test.    Active  Hospital Problems:  There are no active hospital problems to display for this patient.    Plan:     #Near Syncope  -Serial troponins: WNL  -Orthostatic vital signs: WNL  -EKG: Right bundle branch block, transfer to cardiac  -CXR: No acute cardiopulmonary process  -Fall precautions  -Telemetry monitoring  -Echocardiogram and stress test scheduled for the morning    #DAISY  -Creatinine 1.93 with unknown baseline  -GFR 33.7  -Urinalysis: Proteinuria without hematuria  -1 L lactated Ringer's bolus in the ED maintenance LR at 125 mL/h decreased to 75 mL/h  -Renally dose medications    #Leukocytosis  -Blood cell count: 19,000 in the emergency department suspected likely reactive  -CXR: No acute cardiopulmonary process  -Urinalysis: No signs of infection  -Repeat CBC in the a.m.    #HTN  -Continue home hydralazine 25 mg twice daily  -As needed hydralazine For SBP greater than 160  -Telemetry monitoring    #HDL  -Continue home Lipitor 40 mg  -Cardiac diet    #Diabetes mellitus type 2  -Blood glucose 208 in the emergency department with improvement to 201  -Accu-Cheks 4 times daily before meals and at bedtime  -Continue home Farxiga 5 mg daily    #PPX  -SCDs were ordered however patient refused to wear them  -Up with assistance orders in place      CODE STATUS:  Level Of Support Discussed With: Patient  Code Status (Patient has no pulse and is not breathing): CPR (Attempt to Resuscitate)  Medical Interventions (Patient has pulse or is breathing): Full support    Disposition:  I expect patient to be discharged July 9, 2024.    Nahomi Luque PA-C

## 2024-07-09 NOTE — DISCHARGE INSTRUCTIONS
Follow-up with PCP within 2 to 3 days for recheck of symptoms.  Given referral for cardiology who should call to schedule appointment for further evaluation of hypertension as well as presyncope.  Have low threshold to return the emergency department for new, worsening, concerning symptoms.  Recheck potassium level, kidney function with primary care provider.

## 2024-07-18 ENCOUNTER — OFFICE VISIT (OUTPATIENT)
Dept: CARDIOLOGY | Facility: CLINIC | Age: 84
End: 2024-07-18
Payer: MEDICARE

## 2024-07-18 VITALS
SYSTOLIC BLOOD PRESSURE: 164 MMHG | HEART RATE: 66 BPM | HEIGHT: 70 IN | BODY MASS INDEX: 28.13 KG/M2 | DIASTOLIC BLOOD PRESSURE: 76 MMHG | WEIGHT: 196.5 LBS | OXYGEN SATURATION: 94 %

## 2024-07-18 DIAGNOSIS — I10 HYPERTENSION, UNSPECIFIED TYPE: Primary | ICD-10-CM

## 2024-07-18 DIAGNOSIS — E78.5 HYPERLIPIDEMIA, UNSPECIFIED HYPERLIPIDEMIA TYPE: ICD-10-CM

## 2024-07-18 RX ORDER — VALSARTAN 80 MG/1
80 TABLET ORAL DAILY
Qty: 90 TABLET | Refills: 3 | Status: SHIPPED | OUTPATIENT
Start: 2024-07-18

## 2024-07-18 NOTE — PROGRESS NOTES
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.  When he came back had some shortness of breath and noted to have EF 35.  Normalized on GDMT.  Moved from [place] to [place].  Had been working in yard for several hours and felt as if going to pass out.  Went to ER.  Vantage much better after some fluids.  Stress test was relatively normal regarding perfusion.  No CV complaints sine.  ROS negative except for the above.      Objective   Vital Signs:  Vitals:    07/18/24 1345   BP: 164/76   Pulse: 66   SpO2: 94%     Estimated body mass index is 28.19 kg/m² as calculated from the following:    Height as of this encounter: 177.8 cm (70\").    Weight as of this encounter: 89.1 kg (196 lb 8 oz).       Physical Exam  HENT:      Head: Normocephalic.   Eyes:      Extraocular Movements: Extraocular movements intact.   Cardiovascular:      Rate and Rhythm: Normal rate and regular rhythm.      Heart sounds: No murmur heard.     No gallop.   Pulmonary:      Breath sounds: Normal breath sounds.   Abdominal:      Palpations: Abdomen is soft.   Musculoskeletal:      Right lower leg: No edema.      Left lower leg: No edema.   Skin:     General: Skin is warm and dry.   Neurological:      General: No focal deficit present.      Mental Status: He is alert.   Psychiatric:         Mood and Affect: Mood normal.     Clinic discussed advanced directive          Assessment   -Hx of CHF unclear etiology but likely secondary to HTN, ef as low as 35% (resolved with GDMT)  -normal perfusion on nuclear scan in 2024, " extensive CAC (did have some chest pain on treadmill)  -Left carotid endarterectomy  -HTN  -HLD  -DM  -EKG RBBB, LVEF preserved, aortic valve sclerosis    Plan   -Aspirin, statin  -cont. Hydralzine 25 mg, amlodipine 5 mg, coreg 25mg, lasix 20 mg, dapafliflozin 5mg, adding valsartan 80mg.  Blood work in few days.      Return in about 2 weeks (around 8/1/2024).  Joshua Esquivel MD  07/18/2024 14:18 EDT

## 2024-07-22 ENCOUNTER — LAB (OUTPATIENT)
Facility: HOSPITAL | Age: 84
End: 2024-07-22
Payer: MEDICARE

## 2024-07-22 DIAGNOSIS — E78.5 HYPERLIPIDEMIA, UNSPECIFIED HYPERLIPIDEMIA TYPE: ICD-10-CM

## 2024-07-22 DIAGNOSIS — I10 HYPERTENSION, UNSPECIFIED TYPE: ICD-10-CM

## 2024-07-22 LAB
BASOPHILS # BLD AUTO: 0.14 10*3/MM3 (ref 0–0.2)
BASOPHILS NFR BLD AUTO: 1.4 % (ref 0–1.5)
DEPRECATED RDW RBC AUTO: 47.1 FL (ref 37–54)
EOSINOPHIL # BLD AUTO: 0.25 10*3/MM3 (ref 0–0.4)
EOSINOPHIL NFR BLD AUTO: 2.5 % (ref 0.3–6.2)
ERYTHROCYTE [DISTWIDTH] IN BLOOD BY AUTOMATED COUNT: 14.5 % (ref 12.3–15.4)
HCT VFR BLD AUTO: 42.3 % (ref 37.5–51)
HGB BLD-MCNC: 14.3 G/DL (ref 13–17.7)
IMM GRANULOCYTES # BLD AUTO: 0.12 10*3/MM3 (ref 0–0.05)
IMM GRANULOCYTES NFR BLD AUTO: 1.2 % (ref 0–0.5)
LYMPHOCYTES # BLD AUTO: 1.68 10*3/MM3 (ref 0.7–3.1)
LYMPHOCYTES NFR BLD AUTO: 16.7 % (ref 19.6–45.3)
MCH RBC QN AUTO: 30.2 PG (ref 26.6–33)
MCHC RBC AUTO-ENTMCNC: 33.8 G/DL (ref 31.5–35.7)
MCV RBC AUTO: 89.4 FL (ref 79–97)
MONOCYTES # BLD AUTO: 0.84 10*3/MM3 (ref 0.1–0.9)
MONOCYTES NFR BLD AUTO: 8.4 % (ref 5–12)
NEUTROPHILS NFR BLD AUTO: 69.8 % (ref 42.7–76)
NEUTROPHILS NFR BLD AUTO: 7 10*3/MM3 (ref 1.7–7)
NRBC BLD AUTO-RTO: 0 /100 WBC (ref 0–0.2)
PLATELET # BLD AUTO: 189 10*3/MM3 (ref 140–450)
PMV BLD AUTO: 10.4 FL (ref 6–12)
RBC # BLD AUTO: 4.73 10*6/MM3 (ref 4.14–5.8)
WBC NRBC COR # BLD AUTO: 10.03 10*3/MM3 (ref 3.4–10.8)

## 2024-07-22 PROCEDURE — 36415 COLL VENOUS BLD VENIPUNCTURE: CPT

## 2024-07-22 PROCEDURE — 85025 COMPLETE CBC W/AUTO DIFF WBC: CPT

## 2024-07-22 PROCEDURE — 80053 COMPREHEN METABOLIC PANEL: CPT

## 2024-07-22 PROCEDURE — 80061 LIPID PANEL: CPT

## 2024-07-22 PROCEDURE — 86141 C-REACTIVE PROTEIN HS: CPT

## 2024-07-22 PROCEDURE — 83695 ASSAY OF LIPOPROTEIN(A): CPT

## 2024-07-23 ENCOUNTER — TELEPHONE (OUTPATIENT)
Dept: CARDIOLOGY | Facility: CLINIC | Age: 84
End: 2024-07-23
Payer: MEDICARE

## 2024-07-23 LAB
ALBUMIN SERPL-MCNC: 4.2 G/DL (ref 3.5–5.2)
ALBUMIN/GLOB SERPL: 1.6 G/DL
ALP SERPL-CCNC: 94 U/L (ref 39–117)
ALT SERPL W P-5'-P-CCNC: 16 U/L (ref 1–41)
ANION GAP SERPL CALCULATED.3IONS-SCNC: 10 MMOL/L (ref 5–15)
AST SERPL-CCNC: 21 U/L (ref 1–40)
BILIRUB SERPL-MCNC: 0.5 MG/DL (ref 0–1.2)
BUN SERPL-MCNC: 22 MG/DL (ref 8–23)
BUN/CREAT SERPL: 15.1 (ref 7–25)
CALCIUM SPEC-SCNC: 9.4 MG/DL (ref 8.6–10.5)
CHLORIDE SERPL-SCNC: 104 MMOL/L (ref 98–107)
CHOLEST SERPL-MCNC: 138 MG/DL (ref 0–200)
CO2 SERPL-SCNC: 23 MMOL/L (ref 22–29)
CREAT SERPL-MCNC: 1.46 MG/DL (ref 0.76–1.27)
CRP SERPL-MCNC: 0.13 MG/DL (ref 0.01–0.5)
EGFRCR SERPLBLD CKD-EPI 2021: 47.1 ML/MIN/1.73
GLOBULIN UR ELPH-MCNC: 2.7 GM/DL
GLUCOSE SERPL-MCNC: 223 MG/DL (ref 65–99)
HDLC SERPL-MCNC: 38 MG/DL (ref 40–60)
LDLC SERPL CALC-MCNC: 71 MG/DL (ref 0–100)
LDLC/HDLC SERPL: 1.74 {RATIO}
LPA SERPL-SCNC: 30.3 NMOL/L
POTASSIUM SERPL-SCNC: 4 MMOL/L (ref 3.5–5.2)
PROT SERPL-MCNC: 6.9 G/DL (ref 6–8.5)
SODIUM SERPL-SCNC: 137 MMOL/L (ref 136–145)
TRIGL SERPL-MCNC: 170 MG/DL (ref 0–150)
VLDLC SERPL-MCNC: 29 MG/DL (ref 5–40)

## 2024-07-23 NOTE — TELEPHONE ENCOUNTER
----- Message from Joshua Esquivel sent at 7/23/2024  1:00 AM EDT -----  Cholesterol well controlled

## 2024-07-30 LAB
QT INTERVAL: 474 MS
QTC INTERVAL: 469 MS

## 2024-08-08 ENCOUNTER — OFFICE VISIT (OUTPATIENT)
Dept: CARDIOLOGY | Facility: CLINIC | Age: 84
End: 2024-08-08
Payer: MEDICARE

## 2024-08-08 VITALS
BODY MASS INDEX: 28.02 KG/M2 | HEART RATE: 55 BPM | HEIGHT: 70 IN | SYSTOLIC BLOOD PRESSURE: 156 MMHG | DIASTOLIC BLOOD PRESSURE: 72 MMHG | OXYGEN SATURATION: 96 % | WEIGHT: 195.7 LBS

## 2024-08-08 DIAGNOSIS — E78.5 HYPERLIPIDEMIA, UNSPECIFIED HYPERLIPIDEMIA TYPE: ICD-10-CM

## 2024-08-08 DIAGNOSIS — I10 HYPERTENSION, UNSPECIFIED TYPE: Primary | ICD-10-CM

## 2024-08-08 NOTE — PROGRESS NOTES
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  When he came back had some shortness of breath and noted to have EF 35.  Normalized on GDMT.  Moved from [place] to [place].  Had been working in yard for several hours and felt as if going to pass out.  Went to ER.  Drew much better after some fluids.  Stress test was relatively normal regarding perfusion.  No CV complaints sine.  ROS negative except for the above.      Update 8/8/24  Couldn't take ARB secondary to mylagias.  Otherwise no complaints.      Objective   Vital Signs:  Vitals:    08/08/24 1537   BP: 156/72   Pulse: 55   SpO2: 96%     Estimated body mass index is 28.08 kg/m² as calculated from the following:    Height as of this encounter: 177.8 cm (70\").    Weight as of this encounter: 88.8 kg (195 lb 11.2 oz).       Physical Exam  HENT:      Head: Normocephalic.   Eyes:      Extraocular Movements: Extraocular movements intact.   Cardiovascular:      Rate and Rhythm: Normal rate and regular rhythm.      Heart sounds: No murmur heard.     No gallop.   Pulmonary:      Breath sounds: Normal breath sounds.   Abdominal:      Palpations: Abdomen is soft.   Musculoskeletal:      Right lower leg: No edema.      Left lower leg: No edema.   Skin:     General: Skin is warm and dry.   Neurological:      General: No focal deficit present.      Mental Status: He is alert.   Psychiatric:         Mood and Affect: Mood normal.     Clinic discussed advanced directive          Assessment   -Hx of CHF unclear etiology but likely " secondary to HTN, ef as low as 35% (resolved with GDMT)  -normal perfusion on nuclear scan in 2024, extensive CAC (did have some chest pain on treadmill)  -Left carotid endarterectomy  -HTN  -HLD  -DM  -EKG RBBB, LVEF preserved, aortic valve sclerosis    Plan   -Aspirin, statin.  LDL well controlled.    -cont. Hydralzine 25 mg, amlodipine 5 mg, coreg 25mg, lasix 20 mg, dapafliflozin 5mg,     Return in about 6 months (around 2/8/2025).  Joshua Esquivel MD  07/18/2024 14:18 EDT

## 2025-02-27 ENCOUNTER — APPOINTMENT (OUTPATIENT)
Dept: CT IMAGING | Facility: HOSPITAL | Age: 85
End: 2025-02-27
Payer: MEDICARE

## 2025-02-27 ENCOUNTER — HOSPITAL ENCOUNTER (OUTPATIENT)
Facility: HOSPITAL | Age: 85
Setting detail: OBSERVATION
Discharge: HOME | End: 2025-02-28
Attending: EMERGENCY MEDICINE | Admitting: INTERNAL MEDICINE
Payer: MEDICARE

## 2025-02-27 DIAGNOSIS — Z90.49 HISTORY OF COLON RESECTION: ICD-10-CM

## 2025-02-27 DIAGNOSIS — R53.1 GENERALIZED WEAKNESS: ICD-10-CM

## 2025-02-27 DIAGNOSIS — E11.65 HYPERGLYCEMIA DUE TO DIABETES MELLITUS: ICD-10-CM

## 2025-02-27 DIAGNOSIS — K92.2 GASTROINTESTINAL HEMORRHAGE, UNSPECIFIED GASTROINTESTINAL HEMORRHAGE TYPE: Primary | ICD-10-CM

## 2025-02-27 DIAGNOSIS — K57.90 DIVERTICULOSIS: ICD-10-CM

## 2025-02-27 LAB
ABO GROUP BLD: NORMAL
ABO GROUP BLD: NORMAL
ALBUMIN SERPL-MCNC: 4.1 G/DL (ref 3.5–5.2)
ALBUMIN/GLOB SERPL: 1.6 G/DL
ALP SERPL-CCNC: 80 U/L (ref 39–117)
ALT SERPL W P-5'-P-CCNC: 14 U/L (ref 1–41)
ANION GAP SERPL CALCULATED.3IONS-SCNC: 12 MMOL/L (ref 5–15)
AST SERPL-CCNC: 17 U/L (ref 1–40)
BASOPHILS # BLD AUTO: 0.15 10*3/MM3 (ref 0–0.2)
BASOPHILS NFR BLD AUTO: 1.3 % (ref 0–1.5)
BILIRUB SERPL-MCNC: 0.5 MG/DL (ref 0–1.2)
BLD GP AB SCN SERPL QL: NEGATIVE
BUN SERPL-MCNC: 22 MG/DL (ref 8–23)
BUN/CREAT SERPL: 17.9 (ref 7–25)
CALCIUM SPEC-SCNC: 8.8 MG/DL (ref 8.6–10.5)
CHLORIDE SERPL-SCNC: 106 MMOL/L (ref 98–107)
CO2 SERPL-SCNC: 22 MMOL/L (ref 22–29)
CREAT SERPL-MCNC: 1.23 MG/DL (ref 0.76–1.27)
D-LACTATE SERPL-SCNC: 1.3 MMOL/L (ref 0.5–2)
DEPRECATED RDW RBC AUTO: 46.9 FL (ref 37–54)
DEVELOPER EXPIRATION DATE: ABNORMAL
DEVELOPER LOT NUMBER: ABNORMAL
EGFRCR SERPLBLD CKD-EPI 2021: 57.9 ML/MIN/1.73
EOSINOPHIL # BLD AUTO: 0.12 10*3/MM3 (ref 0–0.4)
EOSINOPHIL NFR BLD AUTO: 1 % (ref 0.3–6.2)
ERYTHROCYTE [DISTWIDTH] IN BLOOD BY AUTOMATED COUNT: 14.7 % (ref 12.3–15.4)
EXPIRATION DATE: ABNORMAL
FECAL OCCULT BLOOD SCREEN, POC: POSITIVE
GLOBULIN UR ELPH-MCNC: 2.6 GM/DL
GLUCOSE BLDC GLUCOMTR-MCNC: 152 MG/DL (ref 70–130)
GLUCOSE BLDC GLUCOMTR-MCNC: 199 MG/DL (ref 70–130)
GLUCOSE SERPL-MCNC: 262 MG/DL (ref 65–99)
HBA1C MFR BLD: 9.3 % (ref 4.8–5.6)
HCT VFR BLD AUTO: 36.2 % (ref 37.5–51)
HGB BLD-MCNC: 11.4 G/DL (ref 13–17.7)
HGB BLD-MCNC: 11.9 G/DL (ref 13–17.7)
HGB BLD-MCNC: 12.5 G/DL (ref 13–17.7)
HOLD SPECIMEN: NORMAL
IMM GRANULOCYTES # BLD AUTO: 0.11 10*3/MM3 (ref 0–0.05)
IMM GRANULOCYTES NFR BLD AUTO: 0.9 % (ref 0–0.5)
LIPASE SERPL-CCNC: 29 U/L (ref 13–60)
LYMPHOCYTES # BLD AUTO: 1.31 10*3/MM3 (ref 0.7–3.1)
LYMPHOCYTES NFR BLD AUTO: 11 % (ref 19.6–45.3)
Lab: ABNORMAL
MCH RBC QN AUTO: 30.1 PG (ref 26.6–33)
MCHC RBC AUTO-ENTMCNC: 34.5 G/DL (ref 31.5–35.7)
MCV RBC AUTO: 87.2 FL (ref 79–97)
MONOCYTES # BLD AUTO: 0.63 10*3/MM3 (ref 0.1–0.9)
MONOCYTES NFR BLD AUTO: 5.3 % (ref 5–12)
NEGATIVE CONTROL: NEGATIVE
NEUTROPHILS NFR BLD AUTO: 80.5 % (ref 42.7–76)
NEUTROPHILS NFR BLD AUTO: 9.57 10*3/MM3 (ref 1.7–7)
NRBC BLD AUTO-RTO: 0 /100 WBC (ref 0–0.2)
PLATELET # BLD AUTO: 183 10*3/MM3 (ref 140–450)
PMV BLD AUTO: 9.7 FL (ref 6–12)
POSITIVE CONTROL: POSITIVE
POTASSIUM SERPL-SCNC: 3.9 MMOL/L (ref 3.5–5.2)
PROT SERPL-MCNC: 6.7 G/DL (ref 6–8.5)
RBC # BLD AUTO: 4.15 10*6/MM3 (ref 4.14–5.8)
RH BLD: POSITIVE
RH BLD: POSITIVE
SODIUM SERPL-SCNC: 140 MMOL/L (ref 136–145)
T&S EXPIRATION DATE: NORMAL
WBC NRBC COR # BLD AUTO: 11.89 10*3/MM3 (ref 3.4–10.8)
WHOLE BLOOD HOLD COAG: NORMAL
WHOLE BLOOD HOLD SPECIMEN: NORMAL

## 2025-02-27 PROCEDURE — 82270 OCCULT BLOOD FECES: CPT | Performed by: EMERGENCY MEDICINE

## 2025-02-27 PROCEDURE — 80053 COMPREHEN METABOLIC PANEL: CPT | Performed by: EMERGENCY MEDICINE

## 2025-02-27 PROCEDURE — 36415 COLL VENOUS BLD VENIPUNCTURE: CPT

## 2025-02-27 PROCEDURE — 83605 ASSAY OF LACTIC ACID: CPT | Performed by: EMERGENCY MEDICINE

## 2025-02-27 PROCEDURE — 25510000001 IOPAMIDOL 61 % SOLUTION: Performed by: EMERGENCY MEDICINE

## 2025-02-27 PROCEDURE — 63710000001 INSULIN REGULAR HUMAN PER 5 UNITS: Performed by: STUDENT IN AN ORGANIZED HEALTH CARE EDUCATION/TRAINING PROGRAM

## 2025-02-27 PROCEDURE — 86900 BLOOD TYPING SEROLOGIC ABO: CPT

## 2025-02-27 PROCEDURE — 99204 OFFICE O/P NEW MOD 45 MIN: CPT | Performed by: PHYSICIAN ASSISTANT

## 2025-02-27 PROCEDURE — 83036 HEMOGLOBIN GLYCOSYLATED A1C: CPT | Performed by: STUDENT IN AN ORGANIZED HEALTH CARE EDUCATION/TRAINING PROGRAM

## 2025-02-27 PROCEDURE — G0378 HOSPITAL OBSERVATION PER HR: HCPCS

## 2025-02-27 PROCEDURE — 74177 CT ABD & PELVIS W/CONTRAST: CPT

## 2025-02-27 PROCEDURE — 86901 BLOOD TYPING SEROLOGIC RH(D): CPT

## 2025-02-27 PROCEDURE — 82948 REAGENT STRIP/BLOOD GLUCOSE: CPT

## 2025-02-27 PROCEDURE — 86900 BLOOD TYPING SEROLOGIC ABO: CPT | Performed by: EMERGENCY MEDICINE

## 2025-02-27 PROCEDURE — 96374 THER/PROPH/DIAG INJ IV PUSH: CPT

## 2025-02-27 PROCEDURE — 85025 COMPLETE CBC W/AUTO DIFF WBC: CPT | Performed by: EMERGENCY MEDICINE

## 2025-02-27 PROCEDURE — 25810000003 SODIUM CHLORIDE 0.9 % SOLUTION: Performed by: STUDENT IN AN ORGANIZED HEALTH CARE EDUCATION/TRAINING PROGRAM

## 2025-02-27 PROCEDURE — 85018 HEMOGLOBIN: CPT | Performed by: STUDENT IN AN ORGANIZED HEALTH CARE EDUCATION/TRAINING PROGRAM

## 2025-02-27 PROCEDURE — 86901 BLOOD TYPING SEROLOGIC RH(D): CPT | Performed by: EMERGENCY MEDICINE

## 2025-02-27 PROCEDURE — 99285 EMERGENCY DEPT VISIT HI MDM: CPT

## 2025-02-27 PROCEDURE — 86850 RBC ANTIBODY SCREEN: CPT | Performed by: EMERGENCY MEDICINE

## 2025-02-27 PROCEDURE — 99223 1ST HOSP IP/OBS HIGH 75: CPT | Performed by: STUDENT IN AN ORGANIZED HEALTH CARE EDUCATION/TRAINING PROGRAM

## 2025-02-27 PROCEDURE — 83690 ASSAY OF LIPASE: CPT | Performed by: PHYSICIAN ASSISTANT

## 2025-02-27 RX ORDER — SODIUM CHLORIDE 9 MG/ML
40 INJECTION, SOLUTION INTRAVENOUS AS NEEDED
Status: DISCONTINUED | OUTPATIENT
Start: 2025-02-27 | End: 2025-02-28 | Stop reason: HOSPADM

## 2025-02-27 RX ORDER — SODIUM CHLORIDE 0.9 % (FLUSH) 0.9 %
10 SYRINGE (ML) INJECTION EVERY 12 HOURS SCHEDULED
Status: DISCONTINUED | OUTPATIENT
Start: 2025-02-27 | End: 2025-02-28 | Stop reason: HOSPADM

## 2025-02-27 RX ORDER — PANTOPRAZOLE SODIUM 40 MG/10ML
80 INJECTION, POWDER, LYOPHILIZED, FOR SOLUTION INTRAVENOUS ONCE
Status: COMPLETED | OUTPATIENT
Start: 2025-02-27 | End: 2025-02-27

## 2025-02-27 RX ORDER — DEXTROSE MONOHYDRATE 25 G/50ML
25 INJECTION, SOLUTION INTRAVENOUS
Status: DISCONTINUED | OUTPATIENT
Start: 2025-02-27 | End: 2025-02-28 | Stop reason: HOSPADM

## 2025-02-27 RX ORDER — IOPAMIDOL 612 MG/ML
100 INJECTION, SOLUTION INTRAVASCULAR
Status: COMPLETED | OUTPATIENT
Start: 2025-02-27 | End: 2025-02-27

## 2025-02-27 RX ORDER — IBUPROFEN 600 MG/1
1 TABLET ORAL
Status: DISCONTINUED | OUTPATIENT
Start: 2025-02-27 | End: 2025-02-28 | Stop reason: HOSPADM

## 2025-02-27 RX ORDER — ONDANSETRON 2 MG/ML
4 INJECTION INTRAMUSCULAR; INTRAVENOUS EVERY 6 HOURS PRN
Status: DISCONTINUED | OUTPATIENT
Start: 2025-02-27 | End: 2025-02-28 | Stop reason: HOSPADM

## 2025-02-27 RX ORDER — NITROGLYCERIN 0.4 MG/1
0.4 TABLET SUBLINGUAL
Status: DISCONTINUED | OUTPATIENT
Start: 2025-02-27 | End: 2025-02-28 | Stop reason: HOSPADM

## 2025-02-27 RX ORDER — SODIUM CHLORIDE 0.9 % (FLUSH) 0.9 %
10 SYRINGE (ML) INJECTION AS NEEDED
Status: DISCONTINUED | OUTPATIENT
Start: 2025-02-27 | End: 2025-02-28 | Stop reason: HOSPADM

## 2025-02-27 RX ORDER — SODIUM CHLORIDE 9 MG/ML
75 INJECTION, SOLUTION INTRAVENOUS CONTINUOUS
Status: DISCONTINUED | OUTPATIENT
Start: 2025-02-27 | End: 2025-02-28 | Stop reason: HOSPADM

## 2025-02-27 RX ORDER — NICOTINE POLACRILEX 4 MG
15 LOZENGE BUCCAL
Status: DISCONTINUED | OUTPATIENT
Start: 2025-02-27 | End: 2025-02-28 | Stop reason: HOSPADM

## 2025-02-27 RX ADMIN — SODIUM CHLORIDE 75 ML/HR: 9 INJECTION, SOLUTION INTRAVENOUS at 20:15

## 2025-02-27 RX ADMIN — PANTOPRAZOLE SODIUM 80 MG: 40 INJECTION, POWDER, FOR SOLUTION INTRAVENOUS at 14:36

## 2025-02-27 RX ADMIN — HUMAN INSULIN 2 UNITS: 100 INJECTION, SOLUTION SUBCUTANEOUS at 14:36

## 2025-02-27 RX ADMIN — IOPAMIDOL 90 ML: 612 INJECTION, SOLUTION INTRAVENOUS at 10:58

## 2025-02-27 RX ADMIN — Medication 10 ML: at 20:15

## 2025-02-27 NOTE — CONSULTS
[facility] [place] [facility] Consult    Referring Provider: Brian Kerley, DO     PCP: Provider, No Known    Reason for Consultation: Lower GI hemorrhage     Chief complaint: Rectal bleeding     History of present illness:    Tian Forde is a pleasant 84 y.o. male who is admitted with hematochezia.  He describes acute onset of bright red blood stool last night at 11 PM.   He continued to have episodes throughout the night (approximately 6 total).   He denies any abdominal pain, nausea nor vomiting.   He has history of previous lower GI bleeding associated with diverticulosis while residing in [place].  He states he required a partial colon resection for bleeding secondary to complicated diverticular disease.         He is feeling improved this afternoon.  His last bloody bowel movement was 8 hours ago (8 am).       Allergies:  Patient has no known allergies.    Scheduled Meds:  insulin regular, 2-9 Units, Subcutaneous, Q6H  sodium chloride, 10 mL, Intravenous, Q12H         Infusions:  sodium chloride, 75 mL/hr        PRN Meds:    Calcium Replacement - Follow Nurse / BPA Driven Protocol    dextrose    dextrose    glucagon (human recombinant)    Magnesium Standard Dose Replacement - Follow Nurse / BPA Driven Protocol    nitroglycerin    ondansetron    Phosphorus Replacement - Follow Nurse / BPA Driven Protocol    Potassium Replacement - Follow Nurse / BPA Driven Protocol    sodium chloride    sodium chloride    sodium chloride    Home Meds:  (Not in a hospital admission)      ROS: Review of Systems   Respiratory: Negative.     Cardiovascular: Negative.    Gastrointestinal:  Positive for blood in stool. Negative for abdominal pain, nausea and vomiting.   Genitourinary: Negative.    Musculoskeletal: Negative.    Skin: Negative.    Neurological:  Negative for dizziness and weakness.       PAST MED HX:  Past Medical History:   Diagnosis Date    Diabetes mellitus     Diverticulitis     Hyperlipidemia     Hypertension   "      PAST SURG HX:  Past Surgical History:   Procedure Laterality Date    CAROTID ENDARTERECTOMY      COLON RESECTION      TOTAL HIP ARTHROPLASTY Left        FAM HX:  Family History   Problem Relation Age of Onset    No Known Problems Mother     No Known Problems Father        SOC HX:  Social History     Socioeconomic History    Marital status:    Tobacco Use    Smoking status: Never     Passive exposure: Past    Smokeless tobacco: Never   Vaping Use    Vaping status: Never Used   Substance and Sexual Activity    Alcohol use: Not Currently    Drug use: Never    Sexual activity: Defer       PHYSICAL EXAM  /76   Pulse 54   Temp 97.9 °F (36.6 °C) (Oral)   Resp 16   Ht 180.3 cm (71\")   Wt 88 kg (194 lb)   SpO2 94%   BMI 27.06 kg/m²   Wt Readings from Last 3 Encounters:   02/27/25 88 kg (194 lb)   08/08/24 88.8 kg (195 lb 11.2 oz)   07/18/24 89.1 kg (196 lb 8 oz)   ,body mass index is 27.06 kg/m².  Physical Exam  Constitutional:       General: He is not in acute distress.     Appearance: He is not toxic-appearing.   Cardiovascular:      Rate and Rhythm: Normal rate and regular rhythm.   Pulmonary:      Effort: Pulmonary effort is normal. No respiratory distress.   Abdominal:      General: Bowel sounds are normal. There is no distension.      Palpations: Abdomen is soft.      Tenderness: There is no abdominal tenderness. There is no guarding.   Skin:     General: Skin is warm and dry.   Neurological:      Mental Status: He is alert and oriented to person, place, and time.   Psychiatric:         Behavior: Behavior normal.       Results Review:   I reviewed the patient's new clinical results.    Lab Results   Component Value Date    WBC 11.89 (H) 02/27/2025    HGB 11.9 (L) 02/27/2025    HGB 12.5 (L) 02/27/2025    HGB 14.3 07/22/2024    HCT 36.2 (L) 02/27/2025    MCV 87.2 02/27/2025     02/27/2025       No results found for: \"INR\"    Lab Results   Component Value Date    GLUCOSE 262 (H) 02/27/2025 "    BUN 22 02/27/2025    CREATININE 1.23 02/27/2025    BCR 17.9 02/27/2025     02/27/2025    K 3.9 02/27/2025    CO2 22.0 02/27/2025    CALCIUM 8.8 02/27/2025    ALBUMIN 4.1 02/27/2025    ALKPHOS 80 02/27/2025    BILITOT 0.5 02/27/2025    ALT 14 02/27/2025    AST 17 02/27/2025     CT Abdomen/Pelvis with contrast:  Findings:   Liver: The liver is unremarkable in morphology. No focal liver lesion is seen. No biliary dilation is seen.   Gallbladder: Unremarkable.   Pancreas: Unremarkable.   Spleen: Unremarkable.   Adrenal glands: Unremarkable.   Genitourinary tract: There are several subcentimeter renal hypodensities which are too small to characterize. The kidneys are otherwise unremarkable. No hydronephrosis is seen. The visualized portions of the ureters, urinary bladder, and prostate gland   appear unremarkable.   Gastrointestinal tract: Colonic diverticulosis is present. There are surgical changes of the distal small bowel, ileocecal region and sigmoid colon. Duodenal diverticulum is seen. No findings to suggest bowel obstruction.   Other findings: No free air or free fluid is identified. No pathologically enlarged lymph nodes are seen. Vascular calcifications are present. The IVC is unremarkable.   Bones and soft tissues: No acute osseous lesion is identified. Bones are somewhat demineralized. There are degenerative changes within the spine. There are bilateral L5 pars defects with grade 1 anterolisthesis of L5 with respect to L4 and S1. Left hip   arthroplasty is noted.   Lung bases: The visualized lung bases are clear.   IMPRESSION:  Impression:  1.No acute abnormality identified within the abdomen or pelvis.  2.Colonic diverticulosis with surgical changes of the distal small bowel, ileocecal region, and sigmoid colon.  3.Additional findings as detailed above.    ASSESSMENTS/PLANS    Hematochezia   Normocytic anemia due to blood loss, mild.  History of lower GI hemorrhage due to diverticular disease while  residing in [place].   I do not have records of this at this time.    S/p remote partial colon resection for diverticular disease     Suspect diverticular bleed.   CT abdomen/pelvis with contrast without active extravasation nor diverticulitis.    Patient's bleeding has currently resolved.       >> Discussed colonoscopy for tomorrow.   Given resolution of bleeding at this time patient would like to avoid this if possible.    Will observe overnight.    If recurrence in bleeding will proceed with bowel cleanse.     >> Clear liquid diet  >> Serial H&H  >> Type and screen.      I discussed the patient's findings and my recommendations with patient and his wife via speakerphone in the room.      ANITRA Lee  02/27/25  16:00 EST

## 2025-02-27 NOTE — CASE MANAGEMENT/SOCIAL WORK
Discharge Planning Assessment  [facility]     Patient Name: Tian Forde  MRN: [id number]  Today's Date: 2/27/2025    Admit Date: 2/27/2025    Plan: IDP   Discharge Needs Assessment       Row Name 02/27/25 1405       Living Environment    People in Home spouse    Current Living Arrangements home    Potentially Unsafe Housing Conditions none    Primary Care Provided by self    Family Caregiver if Needed spouse    Quality of Family Relationships helpful;involved    Able to Return to Prior Arrangements yes       Transition Planning    Patient/Family Anticipates Transition to home    Transportation Anticipated family or friend will provide       Discharge Needs Assessment    Readmission Within the Last 30 Days no previous admission in last 30 days    Equipment Currently Used at Home none    Concerns to be Addressed denies needs/concerns at this time                   Discharge Plan       Row Name 02/27/25 1405       Plan    Plan IDP    Plan Comments MSW met with Pt at bedside to complete IDP. Pt lives in [facility] with wife. Pt confirmed demographics and reports no PCP but agreeable to one at discharge- preferably male. Pt has [employer] Medicare insurance coverage. Pt independent with ADLs; Pt reports no DME, HH, or home O2. Pt’s preferred pharmacy is [facility]. Pt still drives and wife can transport when medically ready. Pt denied needs or concerns. CM will continue to follow.                  Continued Care and Services - Admitted Since 2/27/2025    No active coordination exists for this encounter.          Demographic Summary       Row Name 02/27/25 1404       General Information    Arrived From home    Referral Source admission list;emergency department    Reason for Consult discharge planning    Preferred Language English                   Functional Status       Row Name 02/27/25 1404       Functional Status    Usual Activity Tolerance good    Current Activity Tolerance good       Functional Status, IADL     Medications independent    Meal Preparation independent    Housekeeping independent    Laundry independent    Shopping independent                            Patient Forms    No documentation.                     FANG Domingo

## 2025-02-27 NOTE — H&P
[facility] Medicine Services  HISTORY AND PHYSICAL    Patient Name: Tian Forde  : 1940  MRN: [id number]  Primary Care Physician: Provider, No Known  Date of admission: 2025      Subjective   Subjective     Chief Complaint:  Blood in stool    HPI:  Tian Forde is a 84 y.o. male with past medical history of type 2 diabetes, diverticulosis with history of colon resection, hypertension, hyperlipidemia, carotid artery disease.  Presented to [facility] ED on 2025 with concern for bloody stool, 6 episodes since night prior to presentation.  Denies any abdominal pain, rectal pain, fevers, syncope, chest pain, nausea or vomiting.  He takes aspirin but no blood thinners.    ED summary: Afebrile, vital signs stable on room air.  Blood glucose 262.  Lactate not elevated, lipase elevated.  White count 11.  Hemoglobin 12.4.  Fecal occult positive.  CT abdomen/pelvis with contrast no acute abnormality within the abdomen or pelvis, does show colonic diverticulosis with surgical changes of distal small bowel, ileocecal region, sigmoid colon.      Personal History     Past Medical History:   Diagnosis Date    Diabetes mellitus     Diverticulitis     Hyperlipidemia     Hypertension            Past Surgical History:   Procedure Laterality Date    CAROTID ENDARTERECTOMY      COLON RESECTION      TOTAL HIP ARTHROPLASTY Left        Family History: family history includes No Known Problems in his father and mother.     Social History:  reports that he has never smoked. He has been exposed to tobacco smoke. He has never used smokeless tobacco. He reports that he does not currently use alcohol. He reports that he does not use drugs.  Social History     Social History Narrative    Not on file       Medications:  Available home medication information reviewed.  amLODIPine, aspirin, atorvastatin, carvedilol, dapagliflozin, furosemide, hydrALAZINE, potassium chloride, and valsartan    No  Known Allergies    Objective   Objective     Vital Signs:   Temp:  [97.9 °F (36.6 °C)] 97.9 °F (36.6 °C)  Heart Rate:  [52-57] 53  Resp:  [16] 16  BP: (132-163)/(72-77) 147/73       Physical Exam  Constitutional:       General: He is not in acute distress.     Appearance: He is not ill-appearing or toxic-appearing.   HENT:      Mouth/Throat:      Mouth: Mucous membranes are moist.   Eyes:      Extraocular Movements: Extraocular movements intact.   Cardiovascular:      Pulses: Normal pulses.      Heart sounds: Normal heart sounds.   Pulmonary:      Effort: Pulmonary effort is normal.      Breath sounds: Normal breath sounds.   Abdominal:      Palpations: Abdomen is soft.      Tenderness: There is no abdominal tenderness.   Musculoskeletal:      Right lower leg: No edema.      Left lower leg: No edema.   Skin:     General: Skin is warm.   Neurological:      General: No focal deficit present.      Mental Status: He is alert and oriented to person, place, and time.   Psychiatric:         Mood and Affect: Mood normal.         Thought Content: Thought content normal.            Result Review:  I have personally reviewed the results from the time of this admission to 2/27/2025 13:26 EST and agree with these findings:  [x]  Laboratory list / accordion  []  Microbiology  [x]  Radiology  []  EKG/Telemetry   []  Cardiology/Vascular   []  Pathology  [x]  Old records  []  Other:        LAB RESULTS:      Lab 02/27/25  1017   WBC 11.89*   HEMOGLOBIN 12.5*   HEMATOCRIT 36.2*   PLATELETS 183   NEUTROS ABS 9.57*   IMMATURE GRANS (ABS) 0.11*   LYMPHS ABS 1.31   MONOS ABS 0.63   EOS ABS 0.12   MCV 87.2   LACTATE 1.3         Lab 02/27/25  1017   SODIUM 140   POTASSIUM 3.9   CHLORIDE 106   CO2 22.0   ANION GAP 12.0   BUN 22   CREATININE 1.23   EGFR 57.9*   GLUCOSE 262*   CALCIUM 8.8         Lab 02/27/25  1017   TOTAL PROTEIN 6.7   ALBUMIN 4.1   GLOBULIN 2.6   ALT (SGPT) 14   AST (SGOT) 17   BILIRUBIN 0.5   ALK PHOS 80   LIPASE 29                  Lab 02/27/25  1211 02/27/25  1017   ABO TYPING A A   RH TYPING Positive Positive   ANTIBODY SCREEN  --  Negative         UA          7/8/2024    15:37   Urinalysis   Squamous Epithelial Cells, UA 0-2    Specific [place], UA 1.015    Ketones, UA Negative    Blood, UA Negative    Leukocytes, UA Negative    Nitrite, UA Negative    RBC, UA None Seen    WBC, UA None Seen    Bacteria, UA None Seen        Microbiology Results (last 10 days)       ** No results found for the last 240 hours. **            CT Abdomen Pelvis With Contrast    Result Date: 2/27/2025  CT ABDOMEN PELVIS W CONTRAST Date of Exam: 2/27/2025 10:50 AM EST Indication: abd pain, bloody stool. Comparison: None available. Technique: Axial CT images were obtained of the abdomen and pelvis following the uneventful intravenous administration of 90 mL Isovue 300. Reconstructed coronal and sagittal images were also obtained. Automated exposure control and iterative construction methods were used. Findings: Liver: The liver is unremarkable in morphology. No focal liver lesion is seen. No biliary dilation is seen. Gallbladder: Unremarkable. Pancreas: Unremarkable. Spleen: Unremarkable. Adrenal glands: Unremarkable. Genitourinary tract: There are several subcentimeter renal hypodensities which are too small to characterize. The kidneys are otherwise unremarkable. No hydronephrosis is seen. The visualized portions of the ureters, urinary bladder, and prostate gland appear unremarkable. Gastrointestinal tract: Colonic diverticulosis is present. There are surgical changes of the distal small bowel, ileocecal region and sigmoid colon. Duodenal diverticulum is seen. No findings to suggest bowel obstruction. Other findings: No free air or free fluid is identified. No pathologically enlarged lymph nodes are seen. Vascular calcifications are present. The IVC is unremarkable. Bones and soft tissues: No acute osseous lesion is identified. Bones are somewhat  demineralized. There are degenerative changes within the spine. There are bilateral L5 pars defects with grade 1 anterolisthesis of L5 with respect to L4 and S1. Left hip arthroplasty is noted. Lung bases: The visualized lung bases are clear.     Impression: Impression: 1.No acute abnormality identified within the abdomen or pelvis. 2.Colonic diverticulosis with surgical changes of the distal small bowel, ileocecal region, and sigmoid colon. 3.Additional findings as detailed above. Electronically Signed: Jason Lee MD  2/27/2025 11:15 AM EST  Workstation ID: [id number]     Results for orders placed during the hospital encounter of 07/08/24    Adult Transthoracic Echo Complete W/ Cont if Necessary Per Protocol    Interpretation Summary    Left ventricular ejection fraction appears to be 61 - 65%.    Left ventricular diastolic function is consistent with (grade I) impaired relaxation.    There is calcification of the aortic valve.    Mild aortic valve regurgitation is present.    Aortic valve maximum pressure gradient is 14 mmHg.      Assessment & Plan   Assessment & Plan       GI bleed    Observation general floor admission 2/27/2025 for lower GI bleed.    Lower GI bleed  Gastroenterology consultation, recommendations appreciated.  NPO.  IVF.  Type and screen, provide PRBC if hemoglobin under 7 or significant bleeding or if he becomes symptomatic.    Chronic: Type 2 diabetes, diverticulosis, hypertension, hyperlipidemia, carotid artery disease.  Subcutaneous insulin protocol.  Check A1c.    VTE Prophylaxis: SCDs  No VTE prophylaxis order currently exists.          CODE STATUS: Full code  There are no questions and answers to display.       Expected Discharge   Expected discharge date/ time has not been documented.     Brian Joseph Kerley, DO  02/27/25

## 2025-02-27 NOTE — Clinical Note
Level of Care: Telemetry [5]   Diagnosis: GI bleed [421944]   Admitting Physician: KERLEY, BRIAN JOSEPH [[id number]]   Is patient appropriate for Inpatient Observation Unit?: Yes [1]

## 2025-02-27 NOTE — ED PROVIDER NOTES
Subjective   History of Present Illness  Pt is a 85 yo male presenting to ED by EMS with complaints of bloody stool. PMHx significant for HTN, HLD, Carotid artery disease, DM, GI bleed and Diverticulitis. Pt reports having 6 plus episodes of liquid bloody stool since last night. He denies rectal pain or abdominal pain. He reports feeling generalized weakness but denies syncope, headache, confusion, fever, CP, SOB, cough, N/V or urinary sx. He is on ASA, no other blood thinners. He does have hx of GI bleed with colon resection for diverticulitis in 2019 while living in [place]. Denies tobacco, drug or ETOH use.     History provided by:  Patient, medical records, EMS personnel and spouse      Review of Systems   Constitutional:  Negative for chills and fever.   HENT:  Negative for congestion and trouble swallowing.    Eyes:  Negative for visual disturbance.   Respiratory:  Negative for cough and shortness of breath.    Cardiovascular:  Negative for chest pain and leg swelling.   Gastrointestinal:  Positive for blood in stool and diarrhea (chronic). Negative for abdominal pain, constipation, nausea and vomiting.   Genitourinary:  Negative for difficulty urinating, dysuria and flank pain.   Musculoskeletal:  Negative for arthralgias and back pain.   Skin: Negative.  Negative for rash and wound.   Allergic/Immunologic: Negative.    Neurological:  Positive for weakness. Negative for dizziness, syncope, numbness and headaches.   Psychiatric/Behavioral:  Negative for confusion.    All other systems reviewed and are negative.      Past Medical History:   Diagnosis Date    Diabetes mellitus     Diverticulitis     Hyperlipidemia     Hypertension        No Known Allergies    Past Surgical History:   Procedure Laterality Date    CAROTID ENDARTERECTOMY      COLON RESECTION      TOTAL HIP ARTHROPLASTY Left        Family History   Problem Relation Age of Onset    No Known Problems Mother     No Known Problems Father         Social History     Socioeconomic History    Marital status:    Tobacco Use    Smoking status: Never     Passive exposure: Past    Smokeless tobacco: Never   Vaping Use    Vaping status: Never Used   Substance and Sexual Activity    Alcohol use: Not Currently    Drug use: Never    Sexual activity: Defer           Objective   Physical Exam  Vitals and nursing note reviewed.   Constitutional:       Appearance: He is well-developed.   HENT:      Head: Atraumatic.      Nose: Nose normal.   Eyes:      General: Lids are normal.      Conjunctiva/sclera: Conjunctivae normal.      Pupils: Pupils are equal, round, and reactive to light.   Cardiovascular:      Rate and Rhythm: Normal rate and regular rhythm.      Heart sounds: Normal heart sounds.   Pulmonary:      Effort: Pulmonary effort is normal.      Breath sounds: Normal breath sounds.   Abdominal:      General: There is no distension.      Palpations: Abdomen is soft.      Tenderness: There is no abdominal tenderness. There is no guarding or rebound.   Genitourinary:     Rectum: Guaiac result positive.   Musculoskeletal:         General: No tenderness or deformity. Normal range of motion.      Cervical back: Normal range of motion and neck supple.   Skin:     General: Skin is warm and dry.   Neurological:      Mental Status: He is alert and oriented to person, place, and time.      Sensory: No sensory deficit.   Psychiatric:         Behavior: Behavior normal.         Procedures           ED Course  ED Course as of 02/27/25 1348   Thu Feb 27, 2025   1012 I personally reviewed and interpreted labs results and went over with patient.   I personally reviewed and interpreted vitals.   [RT]   1020 BP: 132/74  Stable BP [RT]   1146 I personally and independently reviewed CT abd/pelvis and agree with the radiology interpretation specifically findings of diverticulosis without diverticulitis.  [RT]   1300 WBC(!): 11.89 [RT]   1300 Hemoglobin(!): 12.5 [RT]   1300  Hematocrit(!): 36.2 [RT]   1300 Noted about 2 point drop from most recent but stable [RT]   1301 Creatinine: 1.23 [RT]   1301 BUN: 22 [RT]   1301 Glucose(!): 262  Noted hyperglycemia.  [RT]   1301 Anion Gap: 12.0 [RT]   1301 Lactate: 1.3 [RT]   1305 Fecal Occult Blood(!): Positive [RT]   1314 Discussed patient with Dr. Howard who is agreeable with ED course and tx plan.  [RT]   1316 Discussed admission with hospitalist team.  [RT]   1317 Re-examined patient and updated on results and tx plan. Pt agreeable with plan for admission.  [RT]      ED Course User Index  [RT] No Jerez, PA        Recent Results (from the past 24 hours)   Comprehensive Metabolic Panel    Collection Time: 02/27/25 10:17 AM    Specimen: Blood   Result Value Ref Range    Glucose 262 (H) 65 - 99 mg/dL    BUN 22 8 - 23 mg/dL    Creatinine 1.23 0.76 - 1.27 mg/dL    Sodium 140 136 - 145 mmol/L    Potassium 3.9 3.5 - 5.2 mmol/L    Chloride 106 98 - 107 mmol/L    CO2 22.0 22.0 - 29.0 mmol/L    Calcium 8.8 8.6 - 10.5 mg/dL    Total Protein 6.7 6.0 - 8.5 g/dL    Albumin 4.1 3.5 - 5.2 g/dL    ALT (SGPT) 14 1 - 41 U/L    AST (SGOT) 17 1 - 40 U/L    Alkaline Phosphatase 80 39 - 117 U/L    Total Bilirubin 0.5 0.0 - 1.2 mg/dL    Globulin 2.6 gm/dL    A/G Ratio 1.6 g/dL    BUN/Creatinine Ratio 17.9 7.0 - 25.0    Anion Gap 12.0 5.0 - 15.0 mmol/L    eGFR 57.9 (L) >60.0 mL/min/1.73   Type & Screen    Collection Time: 02/27/25 10:17 AM    Specimen: Blood   Result Value Ref Range    ABO Type A     RH type Positive     Antibody Screen Negative     T&S Expiration Date 3/2/2025 11:59:59 PM    Lactic Acid, Plasma    Collection Time: 02/27/25 10:17 AM    Specimen: Blood   Result Value Ref Range    Lactate 1.3 0.5 - 2.0 mmol/L   Green Top (Gel)    Collection Time: 02/27/25 10:17 AM   Result Value Ref Range    Extra Tube Hold for add-ons.    Lavender Top    Collection Time: 02/27/25 10:17 AM   Result Value Ref Range    Extra Tube hold for add-on    Gold Top - SST     Collection Time: 02/27/25 10:17 AM   Result Value Ref Range    Extra Tube Hold for add-ons.    Gray Top    Collection Time: 02/27/25 10:17 AM   Result Value Ref Range    Extra Tube Hold for add-ons.    Light Blue Top    Collection Time: 02/27/25 10:17 AM   Result Value Ref Range    Extra Tube Hold for add-ons.    CBC Auto Differential    Collection Time: 02/27/25 10:17 AM    Specimen: Blood   Result Value Ref Range    WBC 11.89 (H) 3.40 - 10.80 10*3/mm3    RBC 4.15 4.14 - 5.80 10*6/mm3    Hemoglobin 12.5 (L) 13.0 - 17.7 g/dL    Hematocrit 36.2 (L) 37.5 - 51.0 %    MCV 87.2 79.0 - 97.0 fL    MCH 30.1 26.6 - 33.0 pg    MCHC 34.5 31.5 - 35.7 g/dL    RDW 14.7 12.3 - 15.4 %    RDW-SD 46.9 37.0 - 54.0 fl    MPV 9.7 6.0 - 12.0 fL    Platelets 183 140 - 450 10*3/mm3    Neutrophil % 80.5 (H) 42.7 - 76.0 %    Lymphocyte % 11.0 (L) 19.6 - 45.3 %    Monocyte % 5.3 5.0 - 12.0 %    Eosinophil % 1.0 0.3 - 6.2 %    Basophil % 1.3 0.0 - 1.5 %    Immature Grans % 0.9 (H) 0.0 - 0.5 %    Neutrophils, Absolute 9.57 (H) 1.70 - 7.00 10*3/mm3    Lymphocytes, Absolute 1.31 0.70 - 3.10 10*3/mm3    Monocytes, Absolute 0.63 0.10 - 0.90 10*3/mm3    Eosinophils, Absolute 0.12 0.00 - 0.40 10*3/mm3    Basophils, Absolute 0.15 0.00 - 0.20 10*3/mm3    Immature Grans, Absolute 0.11 (H) 0.00 - 0.05 10*3/mm3    nRBC 0.0 0.0 - 0.2 /100 WBC   Lipase    Collection Time: 02/27/25 10:17 AM    Specimen: Blood   Result Value Ref Range    Lipase 29 13 - 60 U/L   ABO RH Specimen Verification    Collection Time: 02/27/25 12:11 PM    Specimen: Blood   Result Value Ref Range    ABO Type A     RH type Positive    POC Occult Blood Stool    Collection Time: 02/27/25 12:59 PM    Specimen: Stool   Result Value Ref Range    Fecal Occult Blood Positive (A)     Lot Number see package     Expiration Date see package     DEVELOPER LOT NUMBER see package     DEVELOPER EXPIRATION DATE see  package     Positive Control Positive     Negative Control Negative      Note: In  addition to lab results from this visit, the labs listed above may include labs taken at another facility or during a different encounter within the last 24 hours. Please correlate lab times with ED admission and discharge times for further clarification of the services performed during this visit.    CT Abdomen Pelvis With Contrast   Final Result   Impression:   1.No acute abnormality identified within the abdomen or pelvis.   2.Colonic diverticulosis with surgical changes of the distal small bowel, ileocecal region, and sigmoid colon.   3.Additional findings as detailed above.            Electronically Signed: Jason Lee MD     2/27/2025 11:15 AM EST     Workstation ID: [id number]        Vitals:    02/27/25 1158 02/27/25 1233 02/27/25 1301 02/27/25 1331   BP: 153/77 147/73 172/73 155/76   BP Location:       Patient Position:       Pulse: 55 53 55 55   Resp:       Temp:       TempSrc:       SpO2: 96% 95% 94% 94%   Weight:       Height:         Medications   sodium chloride 0.9 % flush 10 mL (has no administration in time range)   pantoprazole (PROTONIX) injection 80 mg (has no administration in time range)   dextrose (GLUTOSE) oral gel 15 g (has no administration in time range)   dextrose (D50W) (25 g/50 mL) IV injection 25 g (has no administration in time range)   glucagon (GLUCAGEN) injection 1 mg (has no administration in time range)   insulin regular (humuLIN R,novoLIN R) injection 2-9 Units (has no administration in time range)   iopamidol (ISOVUE-300) 61 % injection 100 mL (90 mL Intravenous Given 2/27/25 1058)     ECG/EMG Results (last 24 hours)       ** No results found for the last 24 hours. **          No orders to display                                                      Medical Decision Making  Pt is a 85 yo male presenting to ED with complaints of bloody stool. I had a discussion with the patient / family regarding ED course, diagnosis, diagnostic results and treatment plan including medications  and admission    DDx  GI bleed, anemia, diverticulitis, UTI, colon mass, hemorrhoids, sepsis     Problems Addressed:  Diverticulosis: complicated acute illness or injury  Gastrointestinal hemorrhage, unspecified gastrointestinal hemorrhage type: complicated acute illness or injury  Generalized weakness: complicated acute illness or injury  History of colon resection: complicated acute illness or injury  Hyperglycemia due to diabetes mellitus: complicated acute illness or injury    Amount and/or Complexity of Data Reviewed  Independent Historian: spouse  External Data Reviewed: notes.     Details: Reviewed previous non ED visits including prior labs, imaging, available notes, medications, allergies and surgical hx.   Admission 7-2024 for syncope / dehydration   Labs: ordered. Decision-making details documented in ED Course.  Radiology: ordered and independent interpretation performed. Decision-making details documented in ED Course.    Risk  Prescription drug management.  Decision regarding hospitalization.        Final diagnoses:   Gastrointestinal hemorrhage, unspecified gastrointestinal hemorrhage type   Diverticulosis   History of colon resection   Generalized weakness   Hyperglycemia due to diabetes mellitus       ED Disposition  ED Disposition       ED Disposition   Decision to Admit    Condition   --    Comment   Level of Care: Telemetry [5]   Diagnosis: GI bleed [313397]   Admitting Physician: KERLEY, BRIAN JOSEPH [[id number]]                 No follow-up provider specified.       Medication List      No changes were made to your prescriptions during this visit.            No Jerez PA  02/27/25 0639

## 2025-02-28 VITALS
DIASTOLIC BLOOD PRESSURE: 80 MMHG | OXYGEN SATURATION: 94 % | TEMPERATURE: 98 F | BODY MASS INDEX: 27.01 KG/M2 | SYSTOLIC BLOOD PRESSURE: 184 MMHG | RESPIRATION RATE: 18 BRPM | HEIGHT: 71 IN | HEART RATE: 62 BPM | WEIGHT: 192.9 LBS

## 2025-02-28 LAB
ANION GAP SERPL CALCULATED.3IONS-SCNC: 12 MMOL/L (ref 5–15)
APTT PPP: 26.6 SECONDS (ref 22–39)
BUN SERPL-MCNC: 18 MG/DL (ref 8–23)
BUN/CREAT SERPL: 13.2 (ref 7–25)
CALCIUM SPEC-SCNC: 8.5 MG/DL (ref 8.6–10.5)
CHLORIDE SERPL-SCNC: 107 MMOL/L (ref 98–107)
CO2 SERPL-SCNC: 23 MMOL/L (ref 22–29)
CREAT SERPL-MCNC: 1.36 MG/DL (ref 0.76–1.27)
EGFRCR SERPLBLD CKD-EPI 2021: 51.3 ML/MIN/1.73
GLUCOSE BLDC GLUCOMTR-MCNC: 133 MG/DL (ref 70–130)
GLUCOSE BLDC GLUCOMTR-MCNC: 167 MG/DL (ref 70–130)
GLUCOSE BLDC GLUCOMTR-MCNC: 197 MG/DL (ref 70–130)
GLUCOSE SERPL-MCNC: 147 MG/DL (ref 65–99)
HGB BLD-MCNC: 11.1 G/DL (ref 13–17.7)
HGB BLD-MCNC: 11.3 G/DL (ref 13–17.7)
INR PPP: 1 (ref 0.89–1.12)
POTASSIUM SERPL-SCNC: 3.4 MMOL/L (ref 3.5–5.2)
PROTHROMBIN TIME: 13.3 SECONDS (ref 12.2–14.5)
SODIUM SERPL-SCNC: 142 MMOL/L (ref 136–145)

## 2025-02-28 PROCEDURE — 82948 REAGENT STRIP/BLOOD GLUCOSE: CPT

## 2025-02-28 PROCEDURE — 25810000003 SODIUM CHLORIDE 0.9 % SOLUTION: Performed by: STUDENT IN AN ORGANIZED HEALTH CARE EDUCATION/TRAINING PROGRAM

## 2025-02-28 PROCEDURE — 99214 OFFICE O/P EST MOD 30 MIN: CPT | Performed by: PHYSICIAN ASSISTANT

## 2025-02-28 PROCEDURE — 80048 BASIC METABOLIC PNL TOTAL CA: CPT | Performed by: STUDENT IN AN ORGANIZED HEALTH CARE EDUCATION/TRAINING PROGRAM

## 2025-02-28 PROCEDURE — 99239 HOSP IP/OBS DSCHRG MGMT >30: CPT | Performed by: INTERNAL MEDICINE

## 2025-02-28 PROCEDURE — 63710000001 INSULIN REGULAR HUMAN PER 5 UNITS: Performed by: STUDENT IN AN ORGANIZED HEALTH CARE EDUCATION/TRAINING PROGRAM

## 2025-02-28 PROCEDURE — G0378 HOSPITAL OBSERVATION PER HR: HCPCS

## 2025-02-28 PROCEDURE — 85018 HEMOGLOBIN: CPT | Performed by: STUDENT IN AN ORGANIZED HEALTH CARE EDUCATION/TRAINING PROGRAM

## 2025-02-28 PROCEDURE — 85730 THROMBOPLASTIN TIME PARTIAL: CPT | Performed by: STUDENT IN AN ORGANIZED HEALTH CARE EDUCATION/TRAINING PROGRAM

## 2025-02-28 PROCEDURE — 85610 PROTHROMBIN TIME: CPT | Performed by: STUDENT IN AN ORGANIZED HEALTH CARE EDUCATION/TRAINING PROGRAM

## 2025-02-28 RX ORDER — FUROSEMIDE 20 MG/1
20 TABLET ORAL DAILY
Status: DISCONTINUED | OUTPATIENT
Start: 2025-02-28 | End: 2025-02-28 | Stop reason: HOSPADM

## 2025-02-28 RX ORDER — POTASSIUM CHLORIDE 1500 MG/1
40 TABLET, EXTENDED RELEASE ORAL EVERY 4 HOURS
Status: DISCONTINUED | OUTPATIENT
Start: 2025-02-28 | End: 2025-02-28 | Stop reason: HOSPADM

## 2025-02-28 RX ORDER — HYDRALAZINE HYDROCHLORIDE 50 MG/1
25 TABLET, FILM COATED ORAL 2 TIMES DAILY
Status: DISCONTINUED | OUTPATIENT
Start: 2025-02-28 | End: 2025-02-28 | Stop reason: HOSPADM

## 2025-02-28 RX ORDER — CARVEDILOL 12.5 MG/1
25 TABLET ORAL 2 TIMES DAILY WITH MEALS
Status: DISCONTINUED | OUTPATIENT
Start: 2025-02-28 | End: 2025-02-28 | Stop reason: HOSPADM

## 2025-02-28 RX ORDER — AMLODIPINE BESYLATE 5 MG/1
5 TABLET ORAL DAILY
Status: DISCONTINUED | OUTPATIENT
Start: 2025-02-28 | End: 2025-02-28 | Stop reason: HOSPADM

## 2025-02-28 RX ADMIN — FUROSEMIDE 20 MG: 20 TABLET ORAL at 11:47

## 2025-02-28 RX ADMIN — HUMAN INSULIN 2 UNITS: 100 INJECTION, SOLUTION SUBCUTANEOUS at 11:48

## 2025-02-28 RX ADMIN — HYDRALAZINE HYDROCHLORIDE 25 MG: 50 TABLET ORAL at 11:47

## 2025-02-28 RX ADMIN — SODIUM CHLORIDE 75 ML/HR: 9 INJECTION, SOLUTION INTRAVENOUS at 06:03

## 2025-02-28 RX ADMIN — CARVEDILOL 25 MG: 12.5 TABLET, FILM COATED ORAL at 11:47

## 2025-02-28 RX ADMIN — Medication 10 ML: at 09:34

## 2025-02-28 RX ADMIN — AMLODIPINE BESYLATE 5 MG: 5 TABLET ORAL at 11:47

## 2025-02-28 RX ADMIN — HUMAN INSULIN 2 UNITS: 100 INJECTION, SOLUTION SUBCUTANEOUS at 06:01

## 2025-02-28 RX ADMIN — POTASSIUM CHLORIDE 40 MEQ: 1500 TABLET, EXTENDED RELEASE ORAL at 08:59

## 2025-02-28 NOTE — CASE MANAGEMENT/SOCIAL WORK
Continued Stay Note  [facility]     Patient Name: Tian Forde  MRN: [id number]  Today's Date: 2/28/2025    Admit Date: 2/27/2025    Plan: home   Discharge Plan       Row Name 02/28/25 1417       Plan    Plan home    Patient/Family in Agreement with Plan yes    Plan Comments Spoke with patient at the bedside to discuss discharge plan. Patient's plan is home. Family will transport. No discharge needs identified at this time. CM will continue to follow.    Final Discharge Disposition Code 01 - home or self-care                   Discharge Codes    No documentation.                 Expected Discharge Date and Time       Expected Discharge Date Expected Discharge Time    Feb 28, 2025               Lisa Lorenzo RN

## 2025-02-28 NOTE — PLAN OF CARE
Goal Outcome Evaluation:  Plan of Care Reviewed With: patient        Progress: improving          Problem: Adult Inpatient Plan of Care  Goal: Plan of Care Review  Outcome: Progressing  Flowsheets (Taken 2/28/2025 8920)  Progress: improving  Plan of Care Reviewed With: patient

## 2025-02-28 NOTE — DISCHARGE SUMMARY
[facility] Medicine Services  DISCHARGE SUMMARY    Patient Name: Tian Forde  : 1940  MRN: [id number]    Date of Admission: 2025  9:50 AM  Date of Discharge:  25  Primary Care Physician: Provider, No Known    Consults       Date and Time Order Name Status Description    2025  3:24 PM Inpatient Gastroenterology Consult Completed             Hospital Course     Presenting Problem: GI bleed    Active Hospital Problems    Diagnosis  POA    **GI bleed [K92.2]  Yes      Resolved Hospital Problems   No resolved problems to display.          Hospital Course:  Tian Forde is a 84 y.o. male with DM 2, hypertension, hyperlipidemia, CAD and diverticulosis with history of colon resection for recurrent GI bleeds presents after experiencing bloody stools.  Patient denies any abdominal pain, nausea or fevers.    Lower GI bleed  -Patient seen by gastroenterology  -No recurrence of hematochezia for greater than 24 hours.  No diverticulitis on CT scan or abdominal pain to suggest an infection.  -Diet advanced without difficulty.  -Will discharge patient home with instructions to return to the ED if recurrent blood in stool.  -Follow-up with PCP in 1 week with CBC      Discharge Follow Up Recommendations for outpatient labs/diagnostics:  CBC at PCP follow-up    Day of Discharge     HPI:   Feels fine today.  Denies abdominal pain.  No more BRBPR.  Eating well, no nausea.  Would like to go home this afternoon.    Review of Systems  GEN: No fevers    Vital Signs:   Temp:  [97.6 °F (36.4 °C)-98 °F (36.7 °C)] 98 °F (36.7 °C)  Heart Rate:  [50-62] 62  Resp:  [16-18] 18  BP: (142-188)/(70-88) 184/80      Physical Exam:  Mucous membranes moist   Nontoxic.  RRR  CTAB  Abdomen soft and nontender  Alert, speech clear  Normal affect    Pertinent  and/or Most Recent Results     LAB RESULTS:      Lab 25  0610 25  2355 25  1426 25  1017   WBC   --   --   --   --  11.89*   HEMOGLOBIN 11.3* 11.1* 11.4* 11.9* 12.5*   HEMATOCRIT  --   --   --   --  36.2*   PLATELETS  --   --   --   --  183   NEUTROS ABS  --   --   --   --  9.57*   IMMATURE GRANS (ABS)  --   --   --   --  0.11*   LYMPHS ABS  --   --   --   --  1.31   MONOS ABS  --   --   --   --  0.63   EOS ABS  --   --   --   --  0.12   MCV  --   --   --   --  87.2   LACTATE  --   --   --   --  1.3   PROTIME 13.3  --   --   --   --    APTT 26.6  --   --   --   --          Lab 02/28/25  0610 02/27/25  1017   SODIUM 142 140   POTASSIUM 3.4* 3.9   CHLORIDE 107 106   CO2 23.0 22.0   ANION GAP 12.0 12.0   BUN 18 22   CREATININE 1.36* 1.23   EGFR 51.3* 57.9*   GLUCOSE 147* 262*   CALCIUM 8.5* 8.8   HEMOGLOBIN A1C  --  9.30*         Lab 02/27/25  1017   TOTAL PROTEIN 6.7   ALBUMIN 4.1   GLOBULIN 2.6   ALT (SGPT) 14   AST (SGOT) 17   BILIRUBIN 0.5   ALK PHOS 80   LIPASE 29         Lab 02/28/25  0610   PROTIME 13.3   INR 1.00             Lab 02/27/25  1211 02/27/25  1017   ABO TYPING A A   RH TYPING Positive Positive   ANTIBODY SCREEN  --  Negative         Brief Urine Lab Results  (Last result in the past 365 days)        Color   Clarity   Blood   Leuk Est   Nitrite   Protein   CREAT   Urine HCG        07/08/24 1537 Yellow   Clear   Negative   Negative   Negative   30 mg/dL (1+)                 Microbiology Results (last 10 days)       ** No results found for the last 240 hours. **            CT Abdomen Pelvis With Contrast    Result Date: 2/27/2025  CT ABDOMEN PELVIS W CONTRAST Date of Exam: 2/27/2025 10:50 AM EST Indication: abd pain, bloody stool. Comparison: None available. Technique: Axial CT images were obtained of the abdomen and pelvis following the uneventful intravenous administration of 90 mL Isovue 300. Reconstructed coronal and sagittal images were also obtained. Automated exposure control and iterative construction methods were used. Findings: Liver: The liver is unremarkable in morphology. No focal liver  lesion is seen. No biliary dilation is seen. Gallbladder: Unremarkable. Pancreas: Unremarkable. Spleen: Unremarkable. Adrenal glands: Unremarkable. Genitourinary tract: There are several subcentimeter renal hypodensities which are too small to characterize. The kidneys are otherwise unremarkable. No hydronephrosis is seen. The visualized portions of the ureters, urinary bladder, and prostate gland appear unremarkable. Gastrointestinal tract: Colonic diverticulosis is present. There are surgical changes of the distal small bowel, ileocecal region and sigmoid colon. Duodenal diverticulum is seen. No findings to suggest bowel obstruction. Other findings: No free air or free fluid is identified. No pathologically enlarged lymph nodes are seen. Vascular calcifications are present. The IVC is unremarkable. Bones and soft tissues: No acute osseous lesion is identified. Bones are somewhat demineralized. There are degenerative changes within the spine. There are bilateral L5 pars defects with grade 1 anterolisthesis of L5 with respect to L4 and S1. Left hip arthroplasty is noted. Lung bases: The visualized lung bases are clear.     Impression: 1.No acute abnormality identified within the abdomen or pelvis. 2.Colonic diverticulosis with surgical changes of the distal small bowel, ileocecal region, and sigmoid colon. 3.Additional findings as detailed above. Electronically Signed: Jason Lee MD  2/27/2025 11:15 AM EST  Workstation ID: [id number]             Results for orders placed during the hospital encounter of 07/08/24    Adult Transthoracic Echo Complete W/ Cont if Necessary Per Protocol    Interpretation Summary    Left ventricular ejection fraction appears to be 61 - 65%.    Left ventricular diastolic function is consistent with (grade I) impaired relaxation.    There is calcification of the aortic valve.    Mild aortic valve regurgitation is present.    Aortic valve maximum pressure gradient is 14 mmHg.      Plan  for Follow-up of Pending Labs/Results:     Discharge Details        Discharge Medications        Continue These Medications        Instructions Start Date   amLODIPine 5 MG tablet  Commonly known as: NORVASC   5 mg, Daily      aspirin 81 MG EC tablet   81 mg, Daily      atorvastatin 40 MG tablet  Commonly known as: LIPITOR   40 mg, Daily      carvedilol 25 MG tablet  Commonly known as: COREG   25 mg, 2 Times Daily With Meals      Farxiga 5 MG tablet tablet  Generic drug: dapagliflozin   5 mg, Daily      furosemide 20 MG tablet  Commonly known as: LASIX   20 mg, Daily      hydrALAZINE 25 MG tablet  Commonly known as: APRESOLINE   25 mg, 2 Times Daily      potassium chloride 10 MEQ CR tablet   1 tablet, Daily             ASK your doctor about these medications        Instructions Start Date   valsartan 80 MG tablet  Commonly known as: DIOVAN   80 mg, Oral, Daily               No Known Allergies      Discharge Disposition:  Home or Self Care    Diet:  Hospital:  Diet Order   Procedures    Diet: Gastrointestinal; Fiber-Restricted; Fluid Consistency: Thin (IDDSI 0)            Activity:      Restrictions or Other Recommendations:         CODE STATUS:    Code Status and Medical Interventions: CPR (Attempt to Resuscitate); Full Support   Ordered at: 02/27/25 1413     Code Status (Patient has no pulse and is not breathing):    CPR (Attempt to Resuscitate)     Medical Interventions (Patient has pulse or is breathing):    Full Support       No future appointments.    Additional Instructions for the Follow-ups that You Need to Schedule       Discharge Follow-up with PCP   As directed       Currently Documented PCP:    Provider, No Known    PCP Phone Number:    None     Follow Up Details: follow up PCP in one week                      Wilfred Enriquez MD  02/28/25      Time Spent on Discharge:  I spent  35  minutes on this discharge activity which included: face-to-face encounter with the patient, reviewing the data in the  system, coordination of the care with the nursing staff as well as consultants, documentation, and entering orders.

## 2025-02-28 NOTE — PROGRESS NOTES
"GI Daily Progress Note  Subjective:    Chief Complaint:  Follow up hematochezia     Mr. Forde is in very good spirits this morning.   He denies any recurrence in bleeding since arrival.   States he feels very well and wants to go home.        Denies abdominal pain, nausea nor light headedness.       Objective:    /70 (BP Location: Right arm, Patient Position: Lying)   Pulse 56   Temp 97.7 °F (36.5 °C) (Oral)   Resp 18   Ht 180.3 cm (71\")   Wt 87.5 kg (192 lb 14.4 oz)   SpO2 94%   BMI 26.90 kg/m²     Physical Exam  Constitutional:       General: He is not in acute distress.  Cardiovascular:      Rate and Rhythm: Normal rate and regular rhythm.   Pulmonary:      Effort: Pulmonary effort is normal. No respiratory distress.   Abdominal:      General: Bowel sounds are normal. There is no distension.      Palpations: Abdomen is soft.      Tenderness: There is no abdominal tenderness. There is no guarding.   Neurological:      Mental Status: He is alert and oriented to person, place, and time.         Lab  Lab Results   Component Value Date    WBC 11.89 (H) 02/27/2025    HGB 11.3 (L) 02/28/2025    HGB 11.1 (L) 02/27/2025    HGB 11.4 (L) 02/27/2025    MCV 87.2 02/27/2025     02/27/2025    INR 1.00 02/28/2025       Lab Results   Component Value Date    GLUCOSE 147 (H) 02/28/2025    BUN 18 02/28/2025    CREATININE 1.36 (H) 02/28/2025    BCR 13.2 02/28/2025     02/28/2025    K 3.4 (L) 02/28/2025    CO2 23.0 02/28/2025    CALCIUM 8.5 (L) 02/28/2025    ALBUMIN 4.1 02/27/2025    ALKPHOS 80 02/27/2025    BILITOT 0.5 02/27/2025    ALT 14 02/27/2025    AST 17 02/27/2025       Assessment:    Hematochezia   Normocytic anemia due to blood loss, mild.  History of lower GI hemorrhage due to diverticular disease while residing in [place].   I do not have records of this at this time.    S/p remote partial colon resection for diverticular disease     Plan:    No recurrence in bleeding since 8 am yesterday.   " Presumed diverticular source given his history.    No diverticulitis on CT nor abdominal pain to suggest that.       >> Advance diet to low residue    Anticipate home today if continues to be stable without bleeding.       ANITRA Lee  02/28/25  10:32 EST

## 2025-04-10 ENCOUNTER — TELEPHONE (OUTPATIENT)
Dept: CARDIOLOGY | Facility: CLINIC | Age: 85
End: 2025-04-10
Payer: MEDICARE

## 2025-04-10 DIAGNOSIS — E11.9 TYPE 2 DIABETES MELLITUS WITHOUT COMPLICATION, UNSPECIFIED WHETHER LONG TERM INSULIN USE: Primary | ICD-10-CM

## 2025-04-10 DIAGNOSIS — I10 HYPERTENSION, UNSPECIFIED TYPE: Primary | ICD-10-CM

## 2025-04-10 RX ORDER — AMLODIPINE BESYLATE 5 MG/1
5 TABLET ORAL DAILY
Qty: 30 TABLET | Refills: 0 | Status: SHIPPED | OUTPATIENT
Start: 2025-04-10

## 2025-04-10 RX ORDER — DAPAGLIFLOZIN 5 MG/1
5 TABLET, FILM COATED ORAL DAILY
Qty: 30 TABLET | Refills: 1 | Status: SHIPPED | OUTPATIENT
Start: 2025-04-10

## 2025-04-10 RX ORDER — FUROSEMIDE 20 MG/1
20 TABLET ORAL DAILY
Qty: 30 TABLET | Refills: 0 | Status: SHIPPED | OUTPATIENT
Start: 2025-04-10

## 2025-04-10 NOTE — TELEPHONE ENCOUNTER
Called pt and notified him we will refill his farxiga. Verbalized understanding. Requested referral for PCP.

## 2025-04-10 NOTE — TELEPHONE ENCOUNTER
Per Dr. Esquivel DWIGHT 8/8/24 continue amlodipine and lasix. Relayed to pt 1 month supply sent to pharmacy. Pt verbalized understanding.   .  Lab Results   Component Value Date    GLUCOSE 147 (H) 02/28/2025    BUN 18 02/28/2025    CREATININE 1.36 (H) 02/28/2025     02/28/2025    K 3.4 (L) 02/28/2025     02/28/2025    CALCIUM 8.5 (L) 02/28/2025    PROTEINTOT 6.7 02/27/2025    ALBUMIN 4.1 02/27/2025    ALT 14 02/27/2025    AST 17 02/27/2025    ALKPHOS 80 02/27/2025    BILITOT 0.5 02/27/2025    GLOB 2.6 02/27/2025    AGRATIO 1.6 02/27/2025    BCR 13.2 02/28/2025    ANIONGAP 12.0 02/28/2025    EGFR 51.3 (L) 02/28/2025

## 2025-04-10 NOTE — TELEPHONE ENCOUNTER
Caller: Tian Forde    Relationship: Self    Best call back number: [phone]    Requested Prescriptions:   Requested Prescriptions     Pending Prescriptions Disp Refills    furosemide (LASIX) 20 MG tablet       Sig: Take 1 tablet by mouth Daily.    amLODIPine (NORVASC) 5 MG tablet       Sig: Take 1 tablet by mouth Daily.        Pharmacy where request should be sent: [facility]/PHARMACY #[id number] - [place], [place] - [address] AT NEXT TO [facility] [phone] [facility] [phone]      Last office visit with prescribing clinician: 8/8/2024   Last telemedicine visit with prescribing clinician: Visit date not found   Next office visit with prescribing clinician: 4/25/2025     Additional details provided by patient:     Does the patient have less than a 3 day supply:  [] Yes   No        Sara Mendes Rep   04/10/25 11:11 EDT

## 2025-04-10 NOTE — TELEPHONE ENCOUNTER
Caller: Tian Forde    Relationship: Self    Best call back number: [phone]    Requested Prescriptions:   Requested Prescriptions     Pending Prescriptions Disp Refills    dapagliflozin (Farxiga) 5 MG tablet tablet 30 tablet      Sig: Take 1 tablet by mouth Daily.        Pharmacy where request should be sent:      Last office visit with prescribing clinician: 8/8/2024   Last telemedicine visit with prescribing clinician: Visit date not found   Next office visit with prescribing clinician: 4/25/2025     Additional details provided by patient: PT WAS PRESCRIBED THIS BY AN OLD DR HE NO LONGER SEES. HE WOULD LIKE TO SEE IF  CAN FILL IT FOR HIM.    Does the patient have less than a 3 day supply:  [] Yes  [x] No    Would you like a call back once the refill request has been completed: [] Yes [x] No    If the office needs to give you a call back, can they leave a voicemail: [] Yes [x] No    Sara Cummings Rep   04/10/25 12:19 EDT

## 2025-04-15 ENCOUNTER — OFFICE VISIT (OUTPATIENT)
Dept: FAMILY MEDICINE CLINIC | Facility: CLINIC | Age: 85
End: 2025-04-15
Payer: MEDICARE

## 2025-04-15 VITALS
TEMPERATURE: 97.7 F | BODY MASS INDEX: 27.44 KG/M2 | HEART RATE: 55 BPM | OXYGEN SATURATION: 97 % | HEIGHT: 71 IN | SYSTOLIC BLOOD PRESSURE: 132 MMHG | DIASTOLIC BLOOD PRESSURE: 80 MMHG | WEIGHT: 196 LBS

## 2025-04-15 DIAGNOSIS — I25.10 CORONARY ARTERY DISEASE INVOLVING NATIVE CORONARY ARTERY OF NATIVE HEART WITHOUT ANGINA PECTORIS: ICD-10-CM

## 2025-04-15 DIAGNOSIS — I10 ESSENTIAL HYPERTENSION: ICD-10-CM

## 2025-04-15 DIAGNOSIS — L03.031 CELLULITIS OF GREAT TOE OF RIGHT FOOT: ICD-10-CM

## 2025-04-15 DIAGNOSIS — B35.1 ONYCHOMYCOSIS: ICD-10-CM

## 2025-04-15 DIAGNOSIS — R35.1 NOCTURIA: ICD-10-CM

## 2025-04-15 DIAGNOSIS — Z00.01 ENCOUNTER FOR GENERAL ADULT MEDICAL EXAMINATION WITH ABNORMAL FINDINGS: Primary | ICD-10-CM

## 2025-04-15 DIAGNOSIS — M1A.09X0 CHRONIC GOUT OF MULTIPLE SITES, UNSPECIFIED CAUSE: ICD-10-CM

## 2025-04-15 DIAGNOSIS — E11.9 TYPE 2 DIABETES MELLITUS WITHOUT COMPLICATION, WITHOUT LONG-TERM CURRENT USE OF INSULIN: ICD-10-CM

## 2025-04-15 DIAGNOSIS — E78.2 MIXED HYPERLIPIDEMIA: ICD-10-CM

## 2025-04-15 DIAGNOSIS — R53.83 OTHER FATIGUE: ICD-10-CM

## 2025-04-15 DIAGNOSIS — Z23 NEED FOR VACCINATION: ICD-10-CM

## 2025-04-15 LAB
EXPIRATION DATE: ABNORMAL
EXPIRATION DATE: ABNORMAL
GLUCOSE BLDC GLUCOMTR-MCNC: 279 MG/DL (ref 70–130)
HBA1C MFR BLD: 8.6 % (ref 4.5–5.7)
Lab: ABNORMAL
Lab: ABNORMAL

## 2025-04-15 RX ORDER — DAPAGLIFLOZIN 10 MG/1
10 TABLET, FILM COATED ORAL DAILY
Qty: 90 TABLET | Refills: 1 | Status: SHIPPED | OUTPATIENT
Start: 2025-04-15

## 2025-04-15 RX ORDER — TERBINAFINE HYDROCHLORIDE 250 MG/1
250 TABLET ORAL DAILY
Qty: 84 TABLET | Refills: 0 | Status: SHIPPED | OUTPATIENT
Start: 2025-04-15

## 2025-04-15 RX ORDER — CLINDAMYCIN HYDROCHLORIDE 300 MG/1
300 CAPSULE ORAL 3 TIMES DAILY
Qty: 30 CAPSULE | Refills: 0 | Status: SHIPPED | OUTPATIENT
Start: 2025-04-15

## 2025-04-15 RX ORDER — MUPIROCIN 20 MG/G
1 OINTMENT TOPICAL 3 TIMES DAILY
Qty: 22 G | Refills: 1 | Status: SHIPPED | OUTPATIENT
Start: 2025-04-15

## 2025-04-15 RX ORDER — HYDRALAZINE HYDROCHLORIDE 25 MG/1
25 TABLET, FILM COATED ORAL EVERY 12 HOURS SCHEDULED
Qty: 180 TABLET | Refills: 1 | Status: SHIPPED | OUTPATIENT
Start: 2025-04-15

## 2025-04-15 NOTE — ASSESSMENT & PLAN NOTE
Ported diagnosis approximately 2019 for which most recent hemoglobin A1c while in the hospital 2/27/2025 at 9.3%.  Today's hemoglobin A1c 4/15/2025 modestly improved 8.6% on Farxiga 5 mg daily.  It appears he was on Januvia in the past but unclear if ever taking metformin.  Increase Farxiga up to 10 mg daily on 4/15/2025 and we will recheck at 3-month follow-up and if persisting elevated consider adding to his regimen.  Continue with the diet, exercise, increased frequency of glucose monitoring.  Recommend checking feet daily, eye check and at minimum yearly which is due.  Reassess at 3-month follow-up visit, sooner as needed.    Orders:    POC Glycosylated Hemoglobin (Hb A1C)    POC Glucose, Blood    Microalbumin / Creatinine Urine Ratio - Urine, Clean Catch; Future    dapagliflozin Propanediol (Farxiga) 10 MG tablet; Take 10 mg by mouth Daily.

## 2025-04-15 NOTE — ASSESSMENT & PLAN NOTE
Known history of coronary artery disease with associated modest congestive heart failure symptoms as managed through his cardiologist Dr. Joshua Esquivel, with pending appointment 4/25/2025.  Clinically stable, also treating associated hypertension.  Last EKG 7/8/2024 with right bundle branch block which I will defer to management and follow-up through his cardiologist.  I appreciate that ongoing management.

## 2025-04-15 NOTE — ASSESSMENT & PLAN NOTE
Last cholesterol check in the system from 7/22/2024 total cholesterol 138, triglycerides under 70, HDL 38, LDL 71.  This is on atorvastatin 40 mg daily.  I will recheck cholesterol with blood work obtained 4/15/2025, management per results.  Continue healthy diet, exercise and pursuit of even modest weight loss.    Orders:    Comprehensive Metabolic Panel; Future    Lipid Panel; Future

## 2025-04-15 NOTE — PROGRESS NOTES
Subjective   The ABCs of the Annual Wellness Visit  Medicare Wellness Visit      Tian Forde is a 84 y.o. patient who presents for a Medicare Wellness Visit.    The following portions of the patient's history were reviewed and   updated as appropriate: allergies, current medications, past family history, past medical history, past social history, past surgical history, and problem list.    Compared to one year ago, the patient's physical   health is the same.  Compared to one year ago, the patient's mental   health is the same.    Recent Hospitalizations:  This patient has had a [facility] admission record on file within the last 365 days.  Current Medical Providers:  Patient Care Team:  Bryce Viramontes MD as PCP - General (Internal Medicine)  Joshua Esquivel MD as Consulting Physician (Cardiology)    Outpatient Medications Prior to Visit   Medication Sig Dispense Refill    amLODIPine (NORVASC) 5 MG tablet Take 1 tablet by mouth Daily. 30 tablet 0    aspirin 81 MG EC tablet Take 1 tablet by mouth Daily.      atorvastatin (LIPITOR) 40 MG tablet Take 1 tablet by mouth Daily.      carvedilol (COREG) 25 MG tablet Take 1 tablet by mouth 2 (Two) Times a Day With Meals.      furosemide (LASIX) 20 MG tablet Take 1 tablet by mouth Daily. 30 tablet 0    hydrALAZINE (APRESOLINE) 25 MG tablet Take 1 tablet by mouth 2 (Two) Times a Day.      potassium chloride 10 MEQ CR tablet Take 1 tablet by mouth Daily.      dapagliflozin (Farxiga) 5 MG tablet tablet Take 1 tablet by mouth Daily. 30 tablet 1    valsartan (DIOVAN) 80 MG tablet Take 1 tablet by mouth Daily. (Patient not taking: Reported on 4/15/2025) 90 tablet 3     No facility-administered medications prior to visit.     No opioid medication identified on active medication list. I have reviewed chart for other potential  high risk medication/s and harmful drug interactions in the elderly.      Aspirin is on active medication list. Aspirin use is indicated  "based on review of current medical condition/s. Pros and cons of this therapy have been discussed today. Benefits of this medication outweigh potential harm.  Patient has been encouraged to continue taking this medication.  .      Patient Active Problem List   Diagnosis    GI bleed    Encounter for general adult medical examination with abnormal findings    Type 2 diabetes mellitus without complication, without long-term current use of insulin    Mixed hyperlipidemia    Coronary artery disease involving native coronary artery of native heart without angina pectoris    Essential hypertension    Need for vaccination    Onychomycosis    Chronic gout of multiple sites    Cellulitis of great toe of right foot     Advance Care Planning Advance Directive is not on file.  ACP discussion was held with the patient during this visit. Patient does not have an advance directive, information provided.            Objective   Vitals:    04/15/25 0932   BP: 132/80   BP Location: Left arm   Patient Position: Sitting   Cuff Size: Adult   Pulse: 55   Temp: 97.7 °F (36.5 °C)   TempSrc: Temporal   SpO2: 97%   Weight: 88.9 kg (196 lb)   Height: 180.3 cm (71\")   PainSc: 0-No pain       Estimated body mass index is 27.34 kg/m² as calculated from the following:    Height as of this encounter: 180.3 cm (71\").    Weight as of this encounter: 88.9 kg (196 lb).    BMI is >= 25 and <30. (Overweight) The following options were offered after discussion;: weight loss educational material (shared in after visit summary), exercise counseling/recommendations, and nutrition counseling/recommendations      Patient notably has appropriate gait which is balanced with good strength.    Does the patient have evidence of cognitive impairment? No  Lab Results   Component Value Date    HGBA1C 8.6 (A) 04/15/2025    HGBA1C 9.30 (H) 02/27/2025                                                                                                Health  Risk " Assessment    Smoking Status:  Social History     Tobacco Use   Smoking Status Never    Passive exposure: Past   Smokeless Tobacco Never     Alcohol Consumption:  Social History     Substance and Sexual Activity   Alcohol Use Not Currently       Fall Risk Screen  STEADI Fall Risk Assessment was completed, and patient is at MODERATE risk for falls. Assessment completed on:4/15/2025    Depression Screening   Little interest or pleasure in doing things? Not at all   Feeling down, depressed, or hopeless? Not at all   PHQ-2 Total Score 0      Health Habits and Functional and Cognitive Screenin/15/2025     9:36 AM   Functional & Cognitive Status   Do you have difficulty preparing food and eating? No   Do you have difficulty bathing yourself, getting dressed or grooming yourself? No   Do you have difficulty using the toilet? No   Do you have difficulty moving around from place to place? No   Do you have trouble with steps or getting out of a bed or a chair? No   Current Diet Well Balanced Diet   Dental Exam Up to date   Eye Exam Up to date   Exercise (times per week) 1 times per week   Current Exercises Include Yard Work;Walking   Do you need help using the phone?  No   Are you deaf or do you have serious difficulty hearing?  No   Do you need help to go to places out of walking distance? No   Do you need help shopping? No   Do you need help preparing meals?  No   Do you need help with housework?  No   Do you need help with laundry? No   Do you need help taking your medications? No   Do you need help managing money? No   Do you ever drive or ride in a car without wearing a seat belt? No   Have you felt unusual stress, anger or loneliness in the last month? No   Who do you live with? Spouse   If you need help, do you have trouble finding someone available to you? No   Have you been bothered in the last four weeks by sexual problems? No   Do you have difficulty concentrating, remembering or making decisions? No            Age-appropriate Screening Schedule:  Refer to the list below for future screening recommendations based on patient's age, sex and/or medical conditions. Orders for these recommended tests are listed in the plan section. The patient has been provided with a written plan.    Health Maintenance List  Health Maintenance   Topic Date Due    DIABETIC EYE EXAM  Never done    URINE MICROALBUMIN-CREATININE RATIO (uACR)  Never done    Pneumococcal Vaccine 50+ (1 of 2 - PCV) 10/12/2025 (Originally 6/21/1959)    TDAP/TD VACCINES (1 - Tdap) 10/12/2025 (Originally 6/21/1959)    ZOSTER VACCINE (1 of 2) 10/12/2025 (Originally 6/21/1990)    COVID-19 Vaccine (1 - 2024-25 season) 10/15/2025 (Originally 9/1/2024)    RSV Vaccine - Adults (1 - 1-dose 75+ series) 04/15/2026 (Originally 6/21/2015)    INFLUENZA VACCINE  07/01/2025    LIPID PANEL  07/22/2025    HEMOGLOBIN A1C  10/15/2025    ANNUAL WELLNESS VISIT  04/15/2026    DIABETIC FOOT EXAM  04/15/2026                                                                                                                                                CMS Preventative Services Quick Reference  Risk Factors Identified During Encounter  Fall Risk-High or Moderate:  Patient only listed as a moderate fall risk as he did have a fall in the last year but otherwise he has overall good stability, nonetheless appropriate fall risk management discussed    The above risks/problems have been discussed with the patient.  Pertinent information has been shared with the patient in the After Visit Summary.  An After Visit Summary and PPPS were made available to the patient.    Follow Up:   Next Medicare Wellness visit to be scheduled in 1 year.         Additional E&M Note during same encounter follows:  Patient has additional, significant, and separately identifiable condition(s)/problem(s) that require work above and beyond the Medicare Wellness Visit     Chief Complaint  Annual Exam    Subjective    JESSEE Overton is also being seen today for an annual adult preventative physical exam.  and Tian is also being seen today for additional medical problem/s.        In addition to initial visit with wellness, also visit for complete physical exam and follow-up regarding multimedical problems.  Patient is known diabetic for about 5 or 6 years, sounds like he has had somewhat inconsistent follow-up but still has had fairly good control.  Hemoglobin A1c was modestly increased at 9.3% a couple months ago, modest improved 8.6% today, although he does not check his sugars or regularity when he does they tend to be in the 140s to 150 range in the morning.  He tolerates Farxiga without difficulty.  He does check his feet daily but has had not had his eyes checked since last year and is due.  Regarding hyperlipidemia modest overweight pattern, ongoing intensity healthy to be active difficulty maintaining.  He does take atorvastatin and tolerates well.  Regarding coronary artery disease some congestive heart failure type symptoms and associated hypertension he follows up with cardiologist Dr. Joshua Esquivel with overall stability and pending appointment in the next week or so.  No chest pain, palpitations or shortness of breath.  Stability in this regard.  He also has history of partial colonic resections related to GI bleed in the last 10 or so years, resulting in partial removal of the colon.  He is done better recently had normal EKG 2022 without any follow-up recommended based on age.  His left great toe has some redness and swelling on and off the last few weeks on the lateral aspect, no discharge or drainage but some mild tenderness, no fevers or chills.  He also has some thickening of the bilateral toenails that we looked at and he has had for quite a long while but he would be interested in treating.  Patient declines all vaccinations.        Objective   Vital Signs:  /80 (BP Location: Left arm, Patient  "Position: Sitting, Cuff Size: Adult)   Pulse 55   Temp 97.7 °F (36.5 °C) (Temporal)   Ht 180.3 cm (71\")   Wt 88.9 kg (196 lb)   SpO2 97%   BMI 27.34 kg/m²   Physical Exam  Constitutional:       General: He is not in acute distress.     Appearance: Normal appearance. He is not ill-appearing, toxic-appearing or diaphoretic.   HENT:      Head: Normocephalic and atraumatic.      Right Ear: Tympanic membrane, ear canal and external ear normal.      Left Ear: Tympanic membrane, ear canal and external ear normal.      Nose: Nose normal. No rhinorrhea.      Mouth/Throat:      Mouth: Mucous membranes are moist.      Pharynx: Oropharynx is clear. No oropharyngeal exudate or posterior oropharyngeal erythema.   Eyes:      Extraocular Movements: Extraocular movements intact.      Conjunctiva/sclera: Conjunctivae normal.      Pupils: Pupils are equal, round, and reactive to light.   Neck:      Vascular: No carotid bruit.   Cardiovascular:      Rate and Rhythm: Normal rate and regular rhythm.      Pulses: Normal pulses.           Dorsalis pedis pulses are 2+ on the right side.        Posterior tibial pulses are 2+ on the right side and 2+ on the left side.      Heart sounds: Normal heart sounds. No murmur heard.     No friction rub. No gallop.   Pulmonary:      Effort: Pulmonary effort is normal. No respiratory distress.      Breath sounds: Normal breath sounds. No stridor. No wheezing.   Abdominal:      General: Abdomen is flat. Bowel sounds are normal. There is no distension.      Palpations: Abdomen is soft. There is no mass.      Tenderness: There is no abdominal tenderness. There is no guarding or rebound.      Hernia: No hernia is present.   Genitourinary:     Comments: Patient declines  exam  Musculoskeletal:         General: Swelling and tenderness present.      Cervical back: Neck supple. No tenderness.      Right lower leg: No edema.      Left lower leg: No edema.      Comments: Evaluation of the left great toe " reveals on the lateral aspect some mild inflammation erythema consistent with cellulitis pattern but no fluctuance.  Also associated bilateral onychomycosis with yellow thickened nails in the great toes bilaterally   Feet:      Right foot:      Protective Sensation: 8 sites tested.  8 sites sensed.      Skin integrity: Skin integrity normal.      Toenail Condition: Right toenails are abnormally thick. Fungal disease present.     Left foot:      Protective Sensation: 8 sites tested.  8 sites sensed.      Skin integrity: Skin integrity normal.      Toenail Condition: Left toenails are abnormally thick. Fungal disease present.  Lymphadenopathy:      Cervical: No cervical adenopathy.   Skin:     General: Skin is warm and dry.      Capillary Refill: Capillary refill takes less than 2 seconds.      Findings: No rash.   Neurological:      General: No focal deficit present.      Mental Status: He is alert and oriented to person, place, and time. Mental status is at baseline.      Motor: No weakness.      Gait: Gait normal.   Psychiatric:         Mood and Affect: Mood normal.         Behavior: Behavior normal.         Thought Content: Thought content normal.         Judgment: Judgment normal.     Diabetic Foot Exam Performed and Monofilament Test Performed                 Assessment and Plan Additional age appropriate preventative wellness advice topics were discussed during today's preventative wellness exam(some topics already addressed during AWV portion of the note above):    Physical Activity: Advised cardiovascular activity 150 minutes per week as tolerated. (example brisk walk for 30 minutes, 5 days a week).     Nutrition: Discussed nutrition plan with patient. Information shared in after visit summary. Goal is for a well balanced diet to enhance overall health.     Healthy Weight: Discussed current and goal BMI with patient. Steps to attain this goal discussed. Information shared in after visit summary.     Tobacco  Misuse Discussion: Information shared in after visit summary.     Injury Prevention Discussion:  Information shared in after visit summary.           Encounter for general adult medical examination with abnormal findings  Screening blood work obtained today 4/15/2025 with management per results.  Colonoscopy reported last normal 2022, and subsequent colonoscopies deemed not necessary based on age.  Of note he has history of gastrointestinal bleeding resulting in surgeries with removal of the vast majority of his colon, last intervention 2022 through surgeon in [place].  Patient declined all vaccinations, understanding medical recommendation         Type 2 diabetes mellitus without complication, without long-term current use of insulin  Ported diagnosis approximately 2019 for which most recent hemoglobin A1c while in the hospital 2/27/2025 at 9.3%.  Today's hemoglobin A1c 4/15/2025 modestly improved 8.6% on Farxiga 5 mg daily.  It appears he was on Januvia in the past but unclear if ever taking metformin.  Increase Farxiga up to 10 mg daily on 4/15/2025 and we will recheck at 3-month follow-up and if persisting elevated consider adding to his regimen.  Continue with the diet, exercise, increased frequency of glucose monitoring.  Recommend checking feet daily, eye check and at minimum yearly which is due.  Reassess at 3-month follow-up visit, sooner as needed.    Orders:    POC Glycosylated Hemoglobin (Hb A1C)    POC Glucose, Blood    Microalbumin / Creatinine Urine Ratio - Urine, Clean Catch; Future    dapagliflozin Propanediol (Farxiga) 10 MG tablet; Take 10 mg by mouth Daily.    Mixed hyperlipidemia  Last cholesterol check in the system from 7/22/2024 total cholesterol 138, triglycerides under 70, HDL 38, LDL 71.  This is on atorvastatin 40 mg daily.  I will recheck cholesterol with blood work obtained 4/15/2025, management per results.  Continue healthy diet, exercise and pursuit of even modest weight  loss.    Orders:    Comprehensive Metabolic Panel; Future    Lipid Panel; Future    Coronary artery disease involving native coronary artery of native heart without angina pectoris  Known history of coronary artery disease with associated modest congestive heart failure symptoms as managed through his cardiologist Dr. Joshua Esquivel, with pending appointment 4/25/2025.  Clinically stable, also treating associated hypertension.  Last EKG 7/8/2024 with right bundle branch block which I will defer to management and follow-up through his cardiologist.  I appreciate that ongoing management.         Essential hypertension  Longstanding diagnosis as also managed through his cardiologist on regimen of amlodipine 5 mg daily, carvedilol 25 mg twice daily, hydralazine 25 mg twice daily with previous valsartan discontinued as it was making him feel a bit dizzy positionally.  Management through cardiology although I can also help comanage this in the future.  EKG obtained 7/8/2024 with right bundle branch block and stable pattern as manage to cardiology, as such not repeated today 4/15/2025.  Continue monitoring blood pressure regularly, continue good activity level, balanced dietary intake, decrease salt intake.  Recommend increasing blood pressure monitoring at home, he will try to do better in that regard.  Advise concerns.         Chronic gout of multiple sites, unspecified cause  Patient reported history of some gout flares although not in the last year or so, not taking any medicine specifically in that regard.  He does not remember having any preventative medicine.  I will obtain uric acid level with blood work today on 4/15/2025 with management per results.    Orders:    Uric Acid; Future    Need for vaccination  Patient wants all vaccinations, understanding my recommendation of medical benefit         Other fatigue    Orders:    CBC & Differential; Future    TSH Rfx On Abnormal To Free T4;  Future    Nocturia    Orders:    Urinalysis With Culture If Indicated - Urine, Clean Catch; Future    Onychomycosis  Yellow and thickened toenails bilaterally in the great toes consistent with onychomycosis Pap, appears to be somewhat longstanding although patient does remember exactly how long.  He said good normal liver function test and blood work while in the hospital 2/27/2025, such appropriate candidate for terbinafine and he would like to pursue.  Initiate terbinafine to 50 mg daily x 84 days.  Continue good foot hygiene.  Advise if not improving.    Orders:    terbinafine (lamiSIL) 250 MG tablet; Take 1 tablet by mouth Daily.    Cellulitis of great toe of right foot  Great toe with pattern of onychomycosis and associated cellulitis which may be associated with some modest pattern of ingrown toenail.  Treatment of onychomycosis as per that assessment plan.  For more active infection which is modest and lingering on and off last month initiate clindamycin 300 mg 3 times daily x 10 days, mupirocin 2% ointment 3 times daily for 7 to 10 days.  Keep the foot clean, frequent hot water soaks, advised if not improving over the next week or 10 days, where he would then potentially benefit from reassessment.    Orders:    clindamycin (Cleocin) 300 MG capsule; Take 1 capsule by mouth 3 (Three) Times a Day.    mupirocin (BACTROBAN) 2 % ointment; Apply 1 Application topically to the appropriate area as directed 3 (Three) Times a Day.            Follow Up   Return in about 3 months (around 7/15/2025) for Next scheduled follow up.  Patient was given instructions and counseling regarding his condition or for health maintenance advice. Please see specific information pulled into the AVS if appropriate.

## 2025-04-15 NOTE — ASSESSMENT & PLAN NOTE
Yellow and thickened toenails bilaterally in the great toes consistent with onychomycosis Pap, appears to be somewhat longstanding although patient does remember exactly how long.  He said good normal liver function test and blood work while in the hospital 2/27/2025, such appropriate candidate for terbinafine and he would like to pursue.  Initiate terbinafine to 50 mg daily x 84 days.  Continue good foot hygiene.  Advise if not improving.    Orders:    terbinafine (lamiSIL) 250 MG tablet; Take 1 tablet by mouth Daily.

## 2025-04-15 NOTE — ASSESSMENT & PLAN NOTE
Screening blood work obtained today 4/15/2025 with management per results.  Colonoscopy reported last normal 2022, and subsequent colonoscopies deemed not necessary based on age.  Of note he has history of gastrointestinal bleeding resulting in surgeries with removal of the vast majority of his colon, last intervention 2022 through surgeon in [place].  Patient declined all vaccinations, understanding medical recommendation

## 2025-04-15 NOTE — ASSESSMENT & PLAN NOTE
Great toe with pattern of onychomycosis and associated cellulitis which may be associated with some modest pattern of ingrown toenail.  Treatment of onychomycosis as per that assessment plan.  For more active infection which is modest and lingering on and off last month initiate clindamycin 300 mg 3 times daily x 10 days, mupirocin 2% ointment 3 times daily for 7 to 10 days.  Keep the foot clean, frequent hot water soaks, advised if not improving over the next week or 10 days, where he would then potentially benefit from reassessment.    Orders:    clindamycin (Cleocin) 300 MG capsule; Take 1 capsule by mouth 3 (Three) Times a Day.    mupirocin (BACTROBAN) 2 % ointment; Apply 1 Application topically to the appropriate area as directed 3 (Three) Times a Day.

## 2025-04-15 NOTE — ASSESSMENT & PLAN NOTE
Patient reported history of some gout flares although not in the last year or so, not taking any medicine specifically in that regard.  He does not remember having any preventative medicine.  I will obtain uric acid level with blood work today on 4/15/2025 with management per results.    Orders:    Uric Acid; Future

## 2025-04-15 NOTE — PROGRESS NOTES
Venipuncture Blood Specimen Collection  Venipuncture performed in right arm by Elizabeth Kennedy MA with good hemostasis. Patient tolerated the procedure well without complications.   04/15/25   Elizabeth Kennedy MA

## 2025-04-15 NOTE — ASSESSMENT & PLAN NOTE
Longstanding diagnosis as also managed through his cardiologist on regimen of amlodipine 5 mg daily, carvedilol 25 mg twice daily, hydralazine 25 mg twice daily with previous valsartan discontinued as it was making him feel a bit dizzy positionally.  Management through cardiology although I can also help comanage this in the future.  EKG obtained 7/8/2024 with right bundle branch block and stable pattern as manage to cardiology, as such not repeated today 4/15/2025.  Continue monitoring blood pressure regularly, continue good activity level, balanced dietary intake, decrease salt intake.  Recommend increasing blood pressure monitoring at home, he will try to do better in that regard.  Advise concerns.

## 2025-04-15 NOTE — PATIENT INSTRUCTIONS

## 2025-04-16 LAB
ALBUMIN SERPL-MCNC: 4.4 G/DL (ref 3.7–4.7)
ALBUMIN/CREAT UR: 1242 MG/G CREAT (ref 0–29)
ALP SERPL-CCNC: 105 IU/L (ref 44–121)
ALT SERPL-CCNC: 14 IU/L (ref 0–44)
APPEARANCE UR: CLEAR
AST SERPL-CCNC: 19 IU/L (ref 0–40)
BACTERIA #/AREA URNS HPF: NORMAL /[HPF]
BASOPHILS # BLD AUTO: 0.1 X10E3/UL (ref 0–0.2)
BASOPHILS NFR BLD AUTO: 1 %
BILIRUB SERPL-MCNC: 0.5 MG/DL (ref 0–1.2)
BILIRUB UR QL STRIP: NEGATIVE
BUN SERPL-MCNC: 23 MG/DL (ref 8–27)
BUN/CREAT SERPL: 16 (ref 10–24)
CALCIUM SERPL-MCNC: 9.2 MG/DL (ref 8.6–10.2)
CASTS URNS QL MICRO: NORMAL /LPF
CHLORIDE SERPL-SCNC: 106 MMOL/L (ref 96–106)
CHOLEST SERPL-MCNC: 142 MG/DL (ref 100–199)
CO2 SERPL-SCNC: 20 MMOL/L (ref 20–29)
COLOR UR: YELLOW
CREAT SERPL-MCNC: 1.46 MG/DL (ref 0.76–1.27)
CREAT UR-MCNC: 39.1 MG/DL
EGFRCR SERPLBLD CKD-EPI 2021: 47 ML/MIN/1.73
EOSINOPHIL # BLD AUTO: 0.2 X10E3/UL (ref 0–0.4)
EOSINOPHIL NFR BLD AUTO: 2 %
EPI CELLS #/AREA URNS HPF: NORMAL /HPF (ref 0–10)
ERYTHROCYTE [DISTWIDTH] IN BLOOD BY AUTOMATED COUNT: 14.2 % (ref 11.6–15.4)
GLOBULIN SER CALC-MCNC: 2.9 G/DL (ref 1.5–4.5)
GLUCOSE SERPL-MCNC: 245 MG/DL (ref 70–99)
GLUCOSE UR QL STRIP: ABNORMAL
HCT VFR BLD AUTO: 41.5 % (ref 37.5–51)
HDLC SERPL-MCNC: 36 MG/DL
HGB BLD-MCNC: 13.8 G/DL (ref 13–17.7)
HGB UR QL STRIP: NEGATIVE
IMM GRANULOCYTES # BLD AUTO: 0.2 X10E3/UL (ref 0–0.1)
IMM GRANULOCYTES NFR BLD AUTO: 2 %
KETONES UR QL STRIP: NEGATIVE
LDLC SERPL CALC-MCNC: 70 MG/DL (ref 0–99)
LEUKOCYTE ESTERASE UR QL STRIP: NEGATIVE
LYMPHOCYTES # BLD AUTO: 1.5 X10E3/UL (ref 0.7–3.1)
LYMPHOCYTES NFR BLD AUTO: 14 %
MCH RBC QN AUTO: 30.3 PG (ref 26.6–33)
MCHC RBC AUTO-ENTMCNC: 33.3 G/DL (ref 31.5–35.7)
MCV RBC AUTO: 91 FL (ref 79–97)
MICRO URNS: ABNORMAL
MICROALBUMIN UR-MCNC: 485.8 UG/ML
MONOCYTES # BLD AUTO: 0.7 X10E3/UL (ref 0.1–0.9)
MONOCYTES NFR BLD AUTO: 6 %
NEUTROPHILS # BLD AUTO: 8 X10E3/UL (ref 1.4–7)
NEUTROPHILS NFR BLD AUTO: 75 %
NITRITE UR QL STRIP: NEGATIVE
PH UR STRIP: 6.5 [PH] (ref 5–7.5)
PLATELET # BLD AUTO: 214 X10E3/UL (ref 150–450)
POTASSIUM SERPL-SCNC: 4.3 MMOL/L (ref 3.5–5.2)
PROT SERPL-MCNC: 7.3 G/DL (ref 6–8.5)
PROT UR QL STRIP: ABNORMAL
RBC # BLD AUTO: 4.55 X10E6/UL (ref 4.14–5.8)
RBC #/AREA URNS HPF: NORMAL /HPF (ref 0–2)
SODIUM SERPL-SCNC: 143 MMOL/L (ref 134–144)
SP GR UR STRIP: 1.02 (ref 1–1.03)
TRIGL SERPL-MCNC: 217 MG/DL (ref 0–149)
TSH SERPL DL<=0.005 MIU/L-ACNC: 2.73 UIU/ML (ref 0.45–4.5)
URATE SERPL-MCNC: 6.5 MG/DL (ref 3.8–8.4)
URINALYSIS REFLEX: ABNORMAL
UROBILINOGEN UR STRIP-MCNC: 0.2 MG/DL (ref 0.2–1)
VLDLC SERPL CALC-MCNC: 36 MG/DL (ref 5–40)
WBC # BLD AUTO: 10.6 X10E3/UL (ref 3.4–10.8)
WBC #/AREA URNS HPF: NORMAL /HPF (ref 0–5)

## 2025-04-17 DIAGNOSIS — M1A.09X0 CHRONIC GOUT OF MULTIPLE SITES, UNSPECIFIED CAUSE: Primary | ICD-10-CM

## 2025-04-17 DIAGNOSIS — N18.31 TYPE 2 DIABETES MELLITUS WITH STAGE 3A CHRONIC KIDNEY DISEASE, WITHOUT LONG-TERM CURRENT USE OF INSULIN: ICD-10-CM

## 2025-04-17 DIAGNOSIS — E11.22 TYPE 2 DIABETES MELLITUS WITH STAGE 3A CHRONIC KIDNEY DISEASE, WITHOUT LONG-TERM CURRENT USE OF INSULIN: ICD-10-CM

## 2025-04-17 DIAGNOSIS — I10 ESSENTIAL HYPERTENSION: ICD-10-CM

## 2025-04-17 RX ORDER — LOSARTAN POTASSIUM 25 MG/1
25 TABLET ORAL DAILY
Qty: 30 TABLET | Refills: 3 | Status: SHIPPED | OUTPATIENT
Start: 2025-04-17

## 2025-04-17 RX ORDER — ALLOPURINOL 100 MG/1
100 TABLET ORAL DAILY
Qty: 30 TABLET | Refills: 3 | Status: SHIPPED | OUTPATIENT
Start: 2025-04-17

## 2025-04-25 ENCOUNTER — LAB (OUTPATIENT)
Facility: HOSPITAL | Age: 85
End: 2025-04-25
Payer: MEDICARE

## 2025-04-25 ENCOUNTER — OFFICE VISIT (OUTPATIENT)
Dept: CARDIOLOGY | Facility: CLINIC | Age: 85
End: 2025-04-25
Payer: MEDICARE

## 2025-04-25 VITALS
OXYGEN SATURATION: 97 % | BODY MASS INDEX: 27.16 KG/M2 | SYSTOLIC BLOOD PRESSURE: 142 MMHG | DIASTOLIC BLOOD PRESSURE: 60 MMHG | WEIGHT: 194 LBS | HEIGHT: 71 IN | HEART RATE: 55 BPM

## 2025-04-25 DIAGNOSIS — I10 HYPERTENSION, UNSPECIFIED TYPE: ICD-10-CM

## 2025-04-25 DIAGNOSIS — I10 HYPERTENSION, UNSPECIFIED TYPE: Primary | ICD-10-CM

## 2025-04-25 PROCEDURE — 3077F SYST BP >= 140 MM HG: CPT | Performed by: INTERNAL MEDICINE

## 2025-04-25 PROCEDURE — 3078F DIAST BP <80 MM HG: CPT | Performed by: INTERNAL MEDICINE

## 2025-04-25 PROCEDURE — 99214 OFFICE O/P EST MOD 30 MIN: CPT | Performed by: INTERNAL MEDICINE

## 2025-04-25 PROCEDURE — 36415 COLL VENOUS BLD VENIPUNCTURE: CPT

## 2025-04-25 PROCEDURE — 80048 BASIC METABOLIC PNL TOTAL CA: CPT

## 2025-04-25 NOTE — PROGRESS NOTES
"Chief Complaint  Hypertension, unspecified type (Follow up)      Subjective   History of Present Illness    Problem List  -Hx of CHF unclear etiology but likely secondary to HTN, ef as low as 35% (resolved with GDMT)  -normal perfusion on nuclear scan in 2024, extensive CAC (did have some chest pain on treadmill)  -Left carotid endarterectomy  -HTN  -HLD  -DM  -EKG RBBB, LVEF preserved, aortic valve sclerosis    Mr. 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  When he came back had some shortness of breath and noted to have EF 35.  Normalized on GDMT.  Moved from [place] to [place].  Had been working in yard for several hours and felt as if going to pass out.  Went to ER.  [place] much better after some fluids.  Stress test was relatively normal regarding perfusion.  No CV complaints sine.  ROS negative except for the above.      Update 8/8/24  Couldn't take ARB secondary to mylagias.  Otherwise no complaints.      Update 4/25/25  No complaints.  Feeling much better.      Objective   Vital Signs:  Vitals:    04/25/25 1311   BP: 142/60   Pulse: 55   SpO2: 97%     Estimated body mass index is 27.06 kg/m² as calculated from the following:    Height as of this encounter: 180.3 cm (71\").    Weight as of this encounter: 88 kg (194 lb).       Physical Exam  HENT:      Head: Normocephalic.   Eyes:      Extraocular Movements: Extraocular movements intact.   Cardiovascular:      Rate and Rhythm: Normal rate and regular rhythm.      Heart sounds: No murmur heard.     No gallop.   Pulmonary:      Breath sounds: Normal breath sounds.   Abdominal:      Palpations: Abdomen is soft.   Musculoskeletal:      Right lower leg: No edema.      Left lower leg: No edema.   Skin:     General: Skin is warm and dry.   Neurological:      General: No focal deficit present.      Mental Status: He is alert.   Psychiatric:         Mood and Affect: Mood normal.     Clinic discussed advanced directive      "     Assessment   -Hx of CHF unclear etiology but likely secondary to HTN, ef as low as 35% (resolved with GDMT)  -normal perfusion on nuclear scan in 2024, extensive CAC (did have some chest pain on treadmill)  -Left carotid endarterectomy  -HTN  -HLD  -DM  -EKG RBBB, LVEF preserved, aortic valve sclerosis    Plan   -Aspirin, statin.  LDL well controlled.    -cont. Hydralzine 25 mg, amlodipine 5 mg, coreg 25mg, lasix 20 mg, dapafliflozin 5mg,   -repeat blood work    Return in about 6 months (around 10/25/2025).  Joshua Esquivel MD

## 2025-04-26 LAB
ANION GAP SERPL CALCULATED.3IONS-SCNC: 12.3 MMOL/L (ref 5–15)
BUN SERPL-MCNC: 23 MG/DL (ref 8–23)
BUN/CREAT SERPL: 13.9 (ref 7–25)
CALCIUM SPEC-SCNC: 10 MG/DL (ref 8.6–10.5)
CHLORIDE SERPL-SCNC: 105 MMOL/L (ref 98–107)
CO2 SERPL-SCNC: 24.7 MMOL/L (ref 22–29)
CREAT SERPL-MCNC: 1.65 MG/DL (ref 0.76–1.27)
EGFRCR SERPLBLD CKD-EPI 2021: 40.7 ML/MIN/1.73
GLUCOSE SERPL-MCNC: 167 MG/DL (ref 65–99)
POTASSIUM SERPL-SCNC: 4.2 MMOL/L (ref 3.5–5.2)
SODIUM SERPL-SCNC: 142 MMOL/L (ref 136–145)

## 2025-04-27 ENCOUNTER — RESULTS FOLLOW-UP (OUTPATIENT)
Dept: CARDIOLOGY | Facility: CLINIC | Age: 85
End: 2025-04-27
Payer: MEDICARE

## 2025-04-27 DIAGNOSIS — I10 HYPERTENSION, UNSPECIFIED TYPE: Primary | ICD-10-CM

## 2025-04-28 NOTE — TELEPHONE ENCOUNTER
Relayed Dr. Esquivel's comments and recommendation to repeat lab work later this week. Pt verbalized understanding and agreeable to plan of care

## 2025-05-01 ENCOUNTER — LAB (OUTPATIENT)
Facility: HOSPITAL | Age: 85
End: 2025-05-01
Payer: MEDICARE

## 2025-05-01 DIAGNOSIS — I10 HYPERTENSION, UNSPECIFIED TYPE: ICD-10-CM

## 2025-05-01 LAB
ANION GAP SERPL CALCULATED.3IONS-SCNC: 13 MMOL/L (ref 5–15)
BUN SERPL-MCNC: 25 MG/DL (ref 8–23)
BUN/CREAT SERPL: 17.7 (ref 7–25)
CALCIUM SPEC-SCNC: 9.3 MG/DL (ref 8.6–10.5)
CHLORIDE SERPL-SCNC: 102 MMOL/L (ref 98–107)
CO2 SERPL-SCNC: 26 MMOL/L (ref 22–29)
CREAT SERPL-MCNC: 1.41 MG/DL (ref 0.76–1.27)
EGFRCR SERPLBLD CKD-EPI 2021: 49.1 ML/MIN/1.73
GLUCOSE SERPL-MCNC: 250 MG/DL (ref 65–99)
POTASSIUM SERPL-SCNC: 4 MMOL/L (ref 3.5–5.2)
SODIUM SERPL-SCNC: 141 MMOL/L (ref 136–145)

## 2025-05-01 PROCEDURE — 36415 COLL VENOUS BLD VENIPUNCTURE: CPT

## 2025-05-01 PROCEDURE — 80048 BASIC METABOLIC PNL TOTAL CA: CPT

## 2025-05-02 NOTE — TELEPHONE ENCOUNTER
Pt's glucose elevated. Pt's glucose monitored and managed by pcp. Pt not fasting for this lab work. Kidney labs showing improvement. Recheck in 1 month. Pt verbalized understanding and agreeable to plan of care.

## 2025-05-03 DIAGNOSIS — I10 HYPERTENSION, UNSPECIFIED TYPE: ICD-10-CM

## 2025-05-05 RX ORDER — AMLODIPINE BESYLATE 5 MG/1
5 TABLET ORAL DAILY
Qty: 90 TABLET | Refills: 0 | Status: SHIPPED | OUTPATIENT
Start: 2025-05-05

## 2025-05-05 RX ORDER — FUROSEMIDE 20 MG/1
20 TABLET ORAL DAILY
Qty: 90 TABLET | Refills: 0 | Status: SHIPPED | OUTPATIENT
Start: 2025-05-05

## 2025-05-05 NOTE — TELEPHONE ENCOUNTER
Per Dr. Esquivel DWIGHT 4/25/25 continue amlodipine 5 mg daily and lasix 20 mg daily. LVM for pt to return call to see if he is still taking kcl as well.   Lab Results   Component Value Date    GLUCOSE 250 (H) 05/01/2025    BUN 25 (H) 05/01/2025    CREATININE 1.41 (H) 05/01/2025     05/01/2025    K 4.0 05/01/2025     05/01/2025    CALCIUM 9.3 05/01/2025    PROTEINTOT 7.3 04/15/2025    ALBUMIN 4.4 04/15/2025    ALT 14 04/15/2025    AST 19 04/15/2025    ALKPHOS 105 04/15/2025    BILITOT 0.5 04/15/2025    GLOB 2.9 04/15/2025    AGRATIO 1.6 02/27/2025    BCR 17.7 05/01/2025    ANIONGAP 13.0 05/01/2025    EGFR 49.1 (L) 05/01/2025

## 2025-05-24 DIAGNOSIS — I10 HYPERTENSION, UNSPECIFIED TYPE: ICD-10-CM

## 2025-05-27 RX ORDER — FUROSEMIDE 20 MG/1
20 TABLET ORAL DAILY
Qty: 90 TABLET | Refills: 0 | Status: SHIPPED | OUTPATIENT
Start: 2025-05-27

## 2025-05-27 RX ORDER — AMLODIPINE BESYLATE 5 MG/1
5 TABLET ORAL DAILY
Qty: 90 TABLET | Refills: 0 | Status: SHIPPED | OUTPATIENT
Start: 2025-05-27

## 2025-05-30 RX ORDER — ATORVASTATIN CALCIUM 40 MG/1
40 TABLET, FILM COATED ORAL DAILY
Qty: 90 TABLET | Refills: 5 | Status: SHIPPED | OUTPATIENT
Start: 2025-05-30

## 2025-07-13 DIAGNOSIS — E11.22 TYPE 2 DIABETES MELLITUS WITH STAGE 3A CHRONIC KIDNEY DISEASE, WITHOUT LONG-TERM CURRENT USE OF INSULIN: ICD-10-CM

## 2025-07-13 DIAGNOSIS — M1A.09X0 CHRONIC GOUT OF MULTIPLE SITES, UNSPECIFIED CAUSE: ICD-10-CM

## 2025-07-13 DIAGNOSIS — N18.31 TYPE 2 DIABETES MELLITUS WITH STAGE 3A CHRONIC KIDNEY DISEASE, WITHOUT LONG-TERM CURRENT USE OF INSULIN: ICD-10-CM

## 2025-07-13 DIAGNOSIS — I10 ESSENTIAL HYPERTENSION: ICD-10-CM

## 2025-07-14 RX ORDER — ALLOPURINOL 100 MG/1
100 TABLET ORAL DAILY
Qty: 90 TABLET | Refills: 1 | Status: SHIPPED | OUTPATIENT
Start: 2025-07-14

## 2025-07-14 RX ORDER — LOSARTAN POTASSIUM 25 MG/1
25 TABLET ORAL DAILY
Qty: 90 TABLET | Refills: 1 | Status: SHIPPED | OUTPATIENT
Start: 2025-07-14